# Patient Record
Sex: FEMALE | Race: WHITE | NOT HISPANIC OR LATINO | URBAN - METROPOLITAN AREA
[De-identification: names, ages, dates, MRNs, and addresses within clinical notes are randomized per-mention and may not be internally consistent; named-entity substitution may affect disease eponyms.]

---

## 2017-09-13 ENCOUNTER — INPATIENT (INPATIENT)
Facility: HOSPITAL | Age: 82
LOS: 1 days | Discharge: HOME HEALTH | End: 2017-09-15
Attending: INTERNAL MEDICINE | Admitting: ORTHOPAEDIC SURGERY
Payer: MEDICARE

## 2017-09-13 VITALS — WEIGHT: 164.91 LBS

## 2017-09-13 LAB
ALBUMIN SERPL ELPH-MCNC: 3.4 G/DL — SIGNIFICANT CHANGE UP (ref 3.3–5)
ALLERGY+IMMUNOLOGY DIAG STUDY NOTE: SIGNIFICANT CHANGE UP
ALP SERPL-CCNC: 66 U/L — SIGNIFICANT CHANGE UP (ref 40–120)
ALT FLD-CCNC: 31 U/L — SIGNIFICANT CHANGE UP (ref 12–78)
AMMONIA BLD-MCNC: <10 UMOL/L — LOW (ref 11–32)
ANION GAP SERPL CALC-SCNC: 10 MMOL/L — SIGNIFICANT CHANGE UP (ref 5–17)
ANISOCYTOSIS BLD QL: SLIGHT — SIGNIFICANT CHANGE UP
APTT BLD: 22.6 SEC — LOW (ref 27.5–37.4)
AST SERPL-CCNC: 23 U/L — SIGNIFICANT CHANGE UP (ref 15–37)
BASOPHILS # BLD AUTO: 0 K/UL — SIGNIFICANT CHANGE UP (ref 0–0.2)
BASOPHILS NFR BLD AUTO: 0.3 % — SIGNIFICANT CHANGE UP (ref 0–2)
BILIRUB SERPL-MCNC: 0.5 MG/DL — SIGNIFICANT CHANGE UP (ref 0.2–1.2)
BUN SERPL-MCNC: 47 MG/DL — HIGH (ref 7–23)
CALCIUM SERPL-MCNC: 8.8 MG/DL — SIGNIFICANT CHANGE UP (ref 8.5–10.1)
CHLORIDE SERPL-SCNC: 101 MMOL/L — SIGNIFICANT CHANGE UP (ref 96–108)
CO2 SERPL-SCNC: 25 MMOL/L — SIGNIFICANT CHANGE UP (ref 22–31)
CREAT SERPL-MCNC: 1.25 MG/DL — SIGNIFICANT CHANGE UP (ref 0.5–1.3)
DACRYOCYTES BLD QL SMEAR: SLIGHT — SIGNIFICANT CHANGE UP
ELLIPTOCYTES BLD QL SMEAR: SLIGHT — SIGNIFICANT CHANGE UP
EOSINOPHIL # BLD AUTO: 0 K/UL — SIGNIFICANT CHANGE UP (ref 0–0.5)
EOSINOPHIL NFR BLD AUTO: 0.1 % — SIGNIFICANT CHANGE UP (ref 0–6)
GLUCOSE SERPL-MCNC: 415 MG/DL — HIGH (ref 70–99)
HCT VFR BLD CALC: 24.3 % — LOW (ref 34.5–45)
HGB BLD-MCNC: 8.1 G/DL — LOW (ref 11.5–15.5)
INR BLD: 1.07 RATIO — SIGNIFICANT CHANGE UP (ref 0.88–1.16)
LYMPHOCYTES # BLD AUTO: 0.8 K/UL — LOW (ref 1–3.3)
LYMPHOCYTES # BLD AUTO: 10.5 % — LOW (ref 13–44)
MANUAL DIF COMMENT BLD-IMP: SIGNIFICANT CHANGE UP
MCHC RBC-ENTMCNC: 29.3 PG — SIGNIFICANT CHANGE UP (ref 27–34)
MCHC RBC-ENTMCNC: 33.3 GM/DL — SIGNIFICANT CHANGE UP (ref 32–36)
MCV RBC AUTO: 88 FL — SIGNIFICANT CHANGE UP (ref 80–100)
MONOCYTES # BLD AUTO: 1 K/UL — HIGH (ref 0–0.9)
MONOCYTES NFR BLD AUTO: 12.5 % — SIGNIFICANT CHANGE UP (ref 2–14)
NEUTROPHILS # BLD AUTO: 6 K/UL — SIGNIFICANT CHANGE UP (ref 1.8–7.4)
NEUTROPHILS NFR BLD AUTO: 76.5 % — SIGNIFICANT CHANGE UP (ref 43–77)
OVALOCYTES BLD QL SMEAR: SLIGHT — SIGNIFICANT CHANGE UP
PLAT MORPH BLD: NORMAL — SIGNIFICANT CHANGE UP
PLATELET # BLD AUTO: 193 K/UL — SIGNIFICANT CHANGE UP (ref 150–400)
POIKILOCYTOSIS BLD QL AUTO: SLIGHT — SIGNIFICANT CHANGE UP
POTASSIUM SERPL-MCNC: 4.9 MMOL/L — SIGNIFICANT CHANGE UP (ref 3.5–5.3)
POTASSIUM SERPL-SCNC: 4.9 MMOL/L — SIGNIFICANT CHANGE UP (ref 3.5–5.3)
PROT SERPL-MCNC: 6.9 GM/DL — SIGNIFICANT CHANGE UP (ref 6–8.3)
PROTHROM AB SERPL-ACNC: 11.6 SEC — SIGNIFICANT CHANGE UP (ref 9.8–12.7)
RBC # BLD: 2.76 M/UL — LOW (ref 3.8–5.2)
RBC # FLD: 13.1 % — SIGNIFICANT CHANGE UP (ref 10.3–14.5)
RBC BLD AUTO: (no result)
SCHISTOCYTES BLD QL AUTO: SLIGHT — SIGNIFICANT CHANGE UP
SODIUM SERPL-SCNC: 136 MMOL/L — SIGNIFICANT CHANGE UP (ref 135–145)
TROPONIN I SERPL-MCNC: <0.015 NG/ML — SIGNIFICANT CHANGE UP (ref 0.01–0.04)
WBC # BLD: 7.8 K/UL — SIGNIFICANT CHANGE UP (ref 3.8–10.5)
WBC # FLD AUTO: 7.8 K/UL — SIGNIFICANT CHANGE UP (ref 3.8–10.5)

## 2017-09-13 PROCEDURE — 88311 DECALCIFY TISSUE: CPT | Mod: 26

## 2017-09-13 PROCEDURE — 88305 TISSUE EXAM BY PATHOLOGIST: CPT | Mod: 26

## 2017-09-13 PROCEDURE — 99285 EMERGENCY DEPT VISIT HI MDM: CPT

## 2017-09-13 PROCEDURE — 73552 X-RAY EXAM OF FEMUR 2/>: CPT | Mod: 26

## 2017-09-13 PROCEDURE — 76377 3D RENDER W/INTRP POSTPROCES: CPT | Mod: 26

## 2017-09-13 PROCEDURE — 93010 ELECTROCARDIOGRAM REPORT: CPT

## 2017-09-13 PROCEDURE — 72192 CT PELVIS W/O DYE: CPT | Mod: 26

## 2017-09-13 PROCEDURE — 73502 X-RAY EXAM HIP UNI 2-3 VIEWS: CPT | Mod: 26

## 2017-09-13 PROCEDURE — 70450 CT HEAD/BRAIN W/O DYE: CPT | Mod: 26

## 2017-09-13 PROCEDURE — 71010: CPT | Mod: 26

## 2017-09-13 RX ORDER — QUETIAPINE FUMARATE 200 MG/1
25 TABLET, FILM COATED ORAL AT BEDTIME
Qty: 0 | Refills: 0 | Status: DISCONTINUED | OUTPATIENT
Start: 2017-09-13 | End: 2017-09-13

## 2017-09-13 RX ORDER — HYDROMORPHONE HYDROCHLORIDE 2 MG/ML
1 INJECTION INTRAMUSCULAR; INTRAVENOUS; SUBCUTANEOUS ONCE
Qty: 0 | Refills: 0 | Status: DISCONTINUED | OUTPATIENT
Start: 2017-09-13 | End: 2017-09-13

## 2017-09-13 RX ORDER — ONDANSETRON 8 MG/1
4 TABLET, FILM COATED ORAL ONCE
Qty: 0 | Refills: 0 | Status: DISCONTINUED | OUTPATIENT
Start: 2017-09-13 | End: 2017-09-13

## 2017-09-13 RX ORDER — ACETAMINOPHEN 500 MG
650 TABLET ORAL EVERY 6 HOURS
Qty: 0 | Refills: 0 | Status: DISCONTINUED | OUTPATIENT
Start: 2017-09-13 | End: 2017-09-13

## 2017-09-13 RX ORDER — SODIUM CHLORIDE 9 MG/ML
1000 INJECTION, SOLUTION INTRAVENOUS
Qty: 0 | Refills: 0 | Status: DISCONTINUED | OUTPATIENT
Start: 2017-09-13 | End: 2017-09-13

## 2017-09-13 RX ORDER — DEXTROSE 50 % IN WATER 50 %
1 SYRINGE (ML) INTRAVENOUS ONCE
Qty: 0 | Refills: 0 | Status: DISCONTINUED | OUTPATIENT
Start: 2017-09-13 | End: 2017-09-13

## 2017-09-13 RX ORDER — ACETAMINOPHEN 500 MG
1000 TABLET ORAL ONCE
Qty: 0 | Refills: 0 | Status: DISCONTINUED | OUTPATIENT
Start: 2017-09-13 | End: 2017-09-13

## 2017-09-13 RX ORDER — OXYCODONE HYDROCHLORIDE 5 MG/1
5 TABLET ORAL EVERY 4 HOURS
Qty: 0 | Refills: 0 | Status: DISCONTINUED | OUTPATIENT
Start: 2017-09-13 | End: 2017-09-13

## 2017-09-13 RX ORDER — MORPHINE SULFATE 50 MG/1
2 CAPSULE, EXTENDED RELEASE ORAL EVERY 4 HOURS
Qty: 0 | Refills: 0 | Status: DISCONTINUED | OUTPATIENT
Start: 2017-09-13 | End: 2017-09-13

## 2017-09-13 RX ORDER — SODIUM CHLORIDE 9 MG/ML
500 INJECTION INTRAMUSCULAR; INTRAVENOUS; SUBCUTANEOUS ONCE
Qty: 0 | Refills: 0 | Status: COMPLETED | OUTPATIENT
Start: 2017-09-13 | End: 2017-09-13

## 2017-09-13 RX ORDER — INSULIN LISPRO 100/ML
VIAL (ML) SUBCUTANEOUS EVERY 6 HOURS
Qty: 0 | Refills: 0 | Status: DISCONTINUED | OUTPATIENT
Start: 2017-09-13 | End: 2017-09-13

## 2017-09-13 RX ORDER — ATORVASTATIN CALCIUM 80 MG/1
20 TABLET, FILM COATED ORAL AT BEDTIME
Qty: 0 | Refills: 0 | Status: DISCONTINUED | OUTPATIENT
Start: 2017-09-13 | End: 2017-09-13

## 2017-09-13 RX ORDER — DEXTROSE 50 % IN WATER 50 %
25 SYRINGE (ML) INTRAVENOUS ONCE
Qty: 0 | Refills: 0 | Status: DISCONTINUED | OUTPATIENT
Start: 2017-09-13 | End: 2017-09-13

## 2017-09-13 RX ORDER — FENTANYL CITRATE 50 UG/ML
25 INJECTION INTRAVENOUS
Qty: 0 | Refills: 0 | Status: DISCONTINUED | OUTPATIENT
Start: 2017-09-13 | End: 2017-09-13

## 2017-09-13 RX ORDER — VALSARTAN 80 MG/1
0 TABLET ORAL
Qty: 0 | Refills: 0 | COMMUNITY

## 2017-09-13 RX ORDER — GLUCAGON INJECTION, SOLUTION 0.5 MG/.1ML
1 INJECTION, SOLUTION SUBCUTANEOUS ONCE
Qty: 0 | Refills: 0 | Status: DISCONTINUED | OUTPATIENT
Start: 2017-09-13 | End: 2017-09-13

## 2017-09-13 RX ORDER — AMLODIPINE BESYLATE 2.5 MG/1
5 TABLET ORAL DAILY
Qty: 0 | Refills: 0 | Status: DISCONTINUED | OUTPATIENT
Start: 2017-09-13 | End: 2017-09-13

## 2017-09-13 RX ORDER — INSULIN LISPRO 100/ML
8 VIAL (ML) SUBCUTANEOUS ONCE
Qty: 0 | Refills: 0 | Status: COMPLETED | OUTPATIENT
Start: 2017-09-13 | End: 2017-09-13

## 2017-09-13 RX ORDER — METOPROLOL TARTRATE 50 MG
50 TABLET ORAL DAILY
Qty: 0 | Refills: 0 | Status: DISCONTINUED | OUTPATIENT
Start: 2017-09-13 | End: 2017-09-13

## 2017-09-13 RX ORDER — DEXTROSE 50 % IN WATER 50 %
12.5 SYRINGE (ML) INTRAVENOUS ONCE
Qty: 0 | Refills: 0 | Status: DISCONTINUED | OUTPATIENT
Start: 2017-09-13 | End: 2017-09-13

## 2017-09-13 RX ORDER — METOPROLOL TARTRATE 50 MG
0 TABLET ORAL
Qty: 0 | Refills: 0 | COMMUNITY

## 2017-09-13 RX ORDER — ACETAMINOPHEN 500 MG
1000 TABLET ORAL ONCE
Qty: 0 | Refills: 0 | Status: COMPLETED | OUTPATIENT
Start: 2017-09-13 | End: 2017-09-13

## 2017-09-13 RX ORDER — MEPERIDINE HYDROCHLORIDE 50 MG/ML
12.5 INJECTION INTRAMUSCULAR; INTRAVENOUS; SUBCUTANEOUS
Qty: 0 | Refills: 0 | Status: DISCONTINUED | OUTPATIENT
Start: 2017-09-13 | End: 2017-09-13

## 2017-09-13 RX ADMIN — SODIUM CHLORIDE 75 MILLILITER(S): 9 INJECTION, SOLUTION INTRAVENOUS at 19:18

## 2017-09-13 RX ADMIN — FENTANYL CITRATE 25 MICROGRAM(S): 50 INJECTION INTRAVENOUS at 22:22

## 2017-09-13 RX ADMIN — HYDROMORPHONE HYDROCHLORIDE 1 MILLIGRAM(S): 2 INJECTION INTRAMUSCULAR; INTRAVENOUS; SUBCUTANEOUS at 13:30

## 2017-09-13 RX ADMIN — Medication 650 MILLIGRAM(S): at 12:00

## 2017-09-13 RX ADMIN — HYDROMORPHONE HYDROCHLORIDE 1 MILLIGRAM(S): 2 INJECTION INTRAMUSCULAR; INTRAVENOUS; SUBCUTANEOUS at 14:51

## 2017-09-13 RX ADMIN — AMLODIPINE BESYLATE 5 MILLIGRAM(S): 2.5 TABLET ORAL at 14:54

## 2017-09-13 RX ADMIN — SODIUM CHLORIDE 500 MILLILITER(S): 9 INJECTION INTRAMUSCULAR; INTRAVENOUS; SUBCUTANEOUS at 09:45

## 2017-09-13 RX ADMIN — Medication 650 MILLIGRAM(S): at 11:36

## 2017-09-13 RX ADMIN — Medication 8 UNIT(S): at 12:19

## 2017-09-13 RX ADMIN — MORPHINE SULFATE 2 MILLIGRAM(S): 50 CAPSULE, EXTENDED RELEASE ORAL at 11:37

## 2017-09-13 RX ADMIN — MORPHINE SULFATE 2 MILLIGRAM(S): 50 CAPSULE, EXTENDED RELEASE ORAL at 12:00

## 2017-09-13 RX ADMIN — Medication 400 MILLIGRAM(S): at 22:28

## 2017-09-13 RX ADMIN — Medication 50 MILLIGRAM(S): at 14:54

## 2017-09-13 NOTE — H&P ADULT - ASSESSMENT
This is a 82F PMH of CABG, dementia and Type II DM, HTN who fell on Monday (9/11) at home while standing, 24hr Aide was in another room brought in for evaluation of persistent left leg pain fount to have left intertrochanteric fracture:    # Mechanical Fall  # Left Intertrochanteric Fracture  - case discussed with Ortho --> for IMN today.   - patient with remote history of CABG, no chest pain or dsypnea reported, initial trop X 1 negative. Stable EKG. Given advanced age patient is a moderate risk for noncardiac surgery according to RCI risk calculator. No signs of infection and currently hemodynamically stable. Is medically stable for OR today. Case discussed with son in depth, reviewed code status. DNR per son, forms signed.   - hold Aspirin.   - stable Head CT, no evidence of hemorrhage.   - continue home dose BB, hold ARB (on Valsartan / HCTZ 80 - 12.5mg daily). Consider resuming tomorrow.   - NPO, IVFs while NPO  - IV morphine prn, tylenol.  - will likely need PAT upon discharge, son made aware.     # Type II DM - uncontrolled, 's here. Start SSI q 6hrs, holding home meds.   - check A1c.     # HTN - normotensive, cont BB, hold ARB, see above.     # AOCD - unclear, reviewed records in Portal and patient with Hb 8-9. Appears to be at baseline.   - check stool hemoccult, iron studies. Would benefit from iron tabs.   - transfuse 1 u RBC today in preparation for OR.     # Advanced Dementia - outpatient f/u.    DVT ppx: Venodynes pre-op  Dispo: admit to 2N, case discussed w/ RN / ORtho and family.   DNR    Total time > 80 mins.

## 2017-09-13 NOTE — ED PROVIDER NOTE - CARE PLAN
Principal Discharge DX:	Closed displaced intertrochanteric fracture of left femur, initial encounter

## 2017-09-13 NOTE — ED ADULT TRIAGE NOTE - CHIEF COMPLAINT QUOTE
As per pt son, pt fell two days ago and has had difficulty ambulating and performing ADLs, pt has dementia, pt c/o hip pain

## 2017-09-13 NOTE — ED PROVIDER NOTE - OBJECTIVE STATEMENT
PT comes to the ED accompanied by son s/p fall 2 days ago. Pt son states his mother is usually able to ambulate and do her ADL but since fall 2 days ago she cannot move. Pt has an aide who is with her and went to the bathroom and then was placed back in bed. She decided to walk and fell from the bed. Pt then observed for a day to see if she would improve but then did not and can not ambulate without pain. There is mild swelling to the left hip. TTP of the hip and external rotation of the hip. NVID. No other pain. Not on anticoagulants.

## 2017-09-13 NOTE — H&P ADULT - HISTORY OF PRESENT ILLNESS
This is a 82F PMH of CABG, dementia and Type II DM, HTN who fell on Monday (9/11) at home while standing, 24hr Aide was in another room brought in for evaluation of persistent left leg pain fount to have left intertrochanteric fracture. Much history gathered from son at bedside as patient very forgetful and seen uncomfortable in pain. No recent fevers, chills, signs of bleeding. After her fall, family decided to monitor patient but as leg became more painful to ambulate with she was brought in for medical care. Currently describes increased pain especially with palpation otherwise denies chest pain or SOB. Patient not on blood thinners except for low dose Aspirin 81mg.     ROS: negative unless stated above.     PAST MEDICAL & SURGICAL HISTORY:  Advanced Dementia  DM Type II  S/p CABG (13 years ago)  Hx of MDR UTI (hospitalized in 2016)  Anemia of Chronic Disease - longstanding  HTN / HLD    Allergies: NKDA  Social Hx: nonsmoker, no ETOH or drug use. Son is HCP, DNR signed. Lives at home with 24hr Aide.   Surgical Hx: CABG  Family Hx: noncontributory.       Vital Signs Last 24 Hrs  T(C): 36.6 (13 Sep 2017 12:58), Max: 36.6 (13 Sep 2017 08:05)  T(F): 97.8 (13 Sep 2017 12:58), Max: 97.8 (13 Sep 2017 08:05)  HR: 102 (13 Sep 2017 12:58) (84 - 102)  BP: 121/44 (13 Sep 2017 12:58) (119/62 - 151/97)  BP(mean): --  RR: 20 (13 Sep 2017 12:58) (18 - 22)  SpO2: 100% (13 Sep 2017 12:58) (100% - 100%)    PHYSICAL EXAM:  Constitutional: elderly frail appearing female in mild distress from pain.   HEENT: PERR, EOMI, Normal Hearing, MMM  Neck: Soft and supple, No LAD, No JVD  Respiratory: Breath sounds are clear bilaterally, No wheezing, rales or rhonchi  Cardiovascular: S1 and S2, regular rate and rhythm, no Murmurs, gallops or rubs  Gastrointestinal: Bowel Sounds present, soft, nontender, nondistended, no guarding, no rebound  Extremities: No peripheral edema  Vascular: 2+ peripheral pulses  Neurological: A/O x 3, no focal deficits  Musculoskeletal: 5/5 strength b/l upper extremities. LLE with limited ROM, TTP over midshaft. Ecchymosis and skin bruising noted.   Skin: No rashes    MEDICATIONS:  MEDICATIONS  (STANDING):  lactated ringers. 1000 milliLiter(s) (75 mL/Hr) IV Continuous <Continuous>  insulin lispro (HumaLOG) corrective regimen sliding scale   SubCutaneous every 6 hours  dextrose 5%. 1000 milliLiter(s) (50 mL/Hr) IV Continuous <Continuous>  dextrose 50% Injectable 12.5 Gram(s) IV Push once  dextrose 50% Injectable 25 Gram(s) IV Push once  dextrose 50% Injectable 25 Gram(s) IV Push once  atorvastatin 20 milliGRAM(s) Oral at bedtime  QUEtiapine 25 milliGRAM(s) Oral at bedtime  amLODIPine   Tablet 5 milliGRAM(s) Oral daily  metoprolol succinate ER 50 milliGRAM(s) Oral daily      LABS: All Labs Reviewed:                        8.1    7.8   )-----------( 193      ( 13 Sep 2017 08:43 )             24.3     09-13    136  |  101  |  47<H>  ----------------------------<  415<H>  4.9   |  25  |  1.25    Ca    8.8      13 Sep 2017 08:43    TPro  6.9  /  Alb  3.4  /  TBili  0.5  /  DBili  x   /  AST  23  /  ALT  31  /  AlkPhos  66  09-13    PT/INR - ( 13 Sep 2017 08:43 )   PT: 11.6 sec;   INR: 1.07 ratio         PTT - ( 13 Sep 2017 08:43 )  PTT:22.6 sec  CARDIAC MARKERS ( 13 Sep 2017 08:43 )  <0.015 ng/mL / x     / x     / x     / x          EKG: Reviewed NSR HR 93, Qtc 425, nonspecific T wave changes.      CT Head No Cont (09.13.17 @ 09:01) IMPRESSION:       No evidence of acute intracranial abnormality.  No evidence of   hemorrhage.      Chronic changes as above    < from: CT 3D Reconstruct w/ Workstation (09.13.17 @ 11:39) >  Impression: A non displaced comminuted intertrochanteric fracture   involving the proximal left femur as described.   No evidence of pelvic fractures.

## 2017-09-14 LAB
ANION GAP SERPL CALC-SCNC: 9 MMOL/L — SIGNIFICANT CHANGE UP (ref 5–17)
BASOPHILS # BLD AUTO: 0 K/UL — SIGNIFICANT CHANGE UP (ref 0–0.2)
BASOPHILS NFR BLD AUTO: 0.4 % — SIGNIFICANT CHANGE UP (ref 0–2)
BUN SERPL-MCNC: 33 MG/DL — HIGH (ref 7–23)
CALCIUM SERPL-MCNC: 8.1 MG/DL — LOW (ref 8.5–10.1)
CHLORIDE SERPL-SCNC: 103 MMOL/L — SIGNIFICANT CHANGE UP (ref 96–108)
CO2 SERPL-SCNC: 26 MMOL/L — SIGNIFICANT CHANGE UP (ref 22–31)
CREAT SERPL-MCNC: 0.86 MG/DL — SIGNIFICANT CHANGE UP (ref 0.5–1.3)
EOSINOPHIL # BLD AUTO: 0 K/UL — SIGNIFICANT CHANGE UP (ref 0–0.5)
EOSINOPHIL NFR BLD AUTO: 0.5 % — SIGNIFICANT CHANGE UP (ref 0–6)
FERRITIN SERPL-MCNC: 144 NG/ML — SIGNIFICANT CHANGE UP (ref 15–150)
GLUCOSE SERPL-MCNC: 253 MG/DL — HIGH (ref 70–99)
HBA1C BLD-MCNC: 8.2 % — HIGH (ref 4–5.6)
HBA1C BLD-MCNC: 9.3 % — HIGH (ref 4–5.6)
HCT VFR BLD CALC: 24.6 % — LOW (ref 34.5–45)
HGB BLD-MCNC: 8.4 G/DL — LOW (ref 11.5–15.5)
INR BLD: 1.14 RATIO — SIGNIFICANT CHANGE UP (ref 0.88–1.16)
IRON SATN MFR SERPL: 28 % — SIGNIFICANT CHANGE UP (ref 14–50)
IRON SATN MFR SERPL: 63 UG/DL — SIGNIFICANT CHANGE UP (ref 30–160)
LYMPHOCYTES # BLD AUTO: 0.9 K/UL — LOW (ref 1–3.3)
LYMPHOCYTES # BLD AUTO: 11.6 % — LOW (ref 13–44)
MCHC RBC-ENTMCNC: 29.2 PG — SIGNIFICANT CHANGE UP (ref 27–34)
MCHC RBC-ENTMCNC: 34 GM/DL — SIGNIFICANT CHANGE UP (ref 32–36)
MCV RBC AUTO: 85.8 FL — SIGNIFICANT CHANGE UP (ref 80–100)
MONOCYTES # BLD AUTO: 0.9 K/UL — SIGNIFICANT CHANGE UP (ref 0–0.9)
MONOCYTES NFR BLD AUTO: 11.6 % — SIGNIFICANT CHANGE UP (ref 2–14)
NEUTROPHILS # BLD AUTO: 5.9 K/UL — SIGNIFICANT CHANGE UP (ref 1.8–7.4)
NEUTROPHILS NFR BLD AUTO: 76 % — SIGNIFICANT CHANGE UP (ref 43–77)
PLATELET # BLD AUTO: 171 K/UL — SIGNIFICANT CHANGE UP (ref 150–400)
POTASSIUM SERPL-MCNC: 4.3 MMOL/L — SIGNIFICANT CHANGE UP (ref 3.5–5.3)
POTASSIUM SERPL-SCNC: 4.3 MMOL/L — SIGNIFICANT CHANGE UP (ref 3.5–5.3)
PROTHROM AB SERPL-ACNC: 12.3 SEC — SIGNIFICANT CHANGE UP (ref 9.8–12.7)
RBC # BLD: 2.87 M/UL — LOW (ref 3.8–5.2)
RBC # FLD: 13.9 % — SIGNIFICANT CHANGE UP (ref 10.3–14.5)
SODIUM SERPL-SCNC: 138 MMOL/L — SIGNIFICANT CHANGE UP (ref 135–145)
TIBC SERPL-MCNC: 228 UG/DL — SIGNIFICANT CHANGE UP (ref 220–430)
TRANSFERRIN SERPL-MCNC: 188 MG/DL — LOW (ref 200–360)
UIBC SERPL-MCNC: 165 UG/DL — SIGNIFICANT CHANGE UP (ref 110–370)
WBC # BLD: 7.7 K/UL — SIGNIFICANT CHANGE UP (ref 3.8–10.5)
WBC # FLD AUTO: 7.7 K/UL — SIGNIFICANT CHANGE UP (ref 3.8–10.5)

## 2017-09-14 PROCEDURE — 99223 1ST HOSP IP/OBS HIGH 75: CPT

## 2017-09-14 RX ORDER — SODIUM CHLORIDE 9 MG/ML
1000 INJECTION INTRAMUSCULAR; INTRAVENOUS; SUBCUTANEOUS
Qty: 0 | Refills: 0 | Status: DISCONTINUED | OUTPATIENT
Start: 2017-09-14 | End: 2017-09-15

## 2017-09-14 RX ORDER — MEPERIDINE HYDROCHLORIDE 50 MG/ML
12.5 INJECTION INTRAMUSCULAR; INTRAVENOUS; SUBCUTANEOUS
Qty: 0 | Refills: 0 | Status: DISCONTINUED | OUTPATIENT
Start: 2017-09-14 | End: 2017-09-15

## 2017-09-14 RX ORDER — INSULIN LISPRO 100/ML
3 VIAL (ML) SUBCUTANEOUS
Qty: 0 | Refills: 0 | Status: DISCONTINUED | OUTPATIENT
Start: 2017-09-14 | End: 2017-09-15

## 2017-09-14 RX ORDER — OXYCODONE HYDROCHLORIDE 5 MG/1
5 TABLET ORAL EVERY 4 HOURS
Qty: 0 | Refills: 0 | Status: DISCONTINUED | OUTPATIENT
Start: 2017-09-14 | End: 2017-09-15

## 2017-09-14 RX ORDER — ACETAMINOPHEN 500 MG
650 TABLET ORAL EVERY 6 HOURS
Qty: 0 | Refills: 0 | Status: DISCONTINUED | OUTPATIENT
Start: 2017-09-14 | End: 2017-09-15

## 2017-09-14 RX ORDER — DEXTROSE 50 % IN WATER 50 %
25 SYRINGE (ML) INTRAVENOUS ONCE
Qty: 0 | Refills: 0 | Status: DISCONTINUED | OUTPATIENT
Start: 2017-09-14 | End: 2017-09-14

## 2017-09-14 RX ORDER — DOCUSATE SODIUM 100 MG
100 CAPSULE ORAL THREE TIMES A DAY
Qty: 0 | Refills: 0 | Status: DISCONTINUED | OUTPATIENT
Start: 2017-09-14 | End: 2017-09-15

## 2017-09-14 RX ORDER — INSULIN LISPRO 100/ML
VIAL (ML) SUBCUTANEOUS AT BEDTIME
Qty: 0 | Refills: 0 | Status: DISCONTINUED | OUTPATIENT
Start: 2017-09-14 | End: 2017-09-15

## 2017-09-14 RX ORDER — DEXTROSE 50 % IN WATER 50 %
12.5 SYRINGE (ML) INTRAVENOUS ONCE
Qty: 0 | Refills: 0 | Status: DISCONTINUED | OUTPATIENT
Start: 2017-09-14 | End: 2017-09-14

## 2017-09-14 RX ORDER — HYDROMORPHONE HYDROCHLORIDE 2 MG/ML
0.5 INJECTION INTRAMUSCULAR; INTRAVENOUS; SUBCUTANEOUS
Qty: 0 | Refills: 0 | Status: DISCONTINUED | OUTPATIENT
Start: 2017-09-14 | End: 2017-09-15

## 2017-09-14 RX ORDER — DEXTROSE 50 % IN WATER 50 %
25 SYRINGE (ML) INTRAVENOUS ONCE
Qty: 0 | Refills: 0 | Status: DISCONTINUED | OUTPATIENT
Start: 2017-09-14 | End: 2017-09-15

## 2017-09-14 RX ORDER — DEXTROSE 50 % IN WATER 50 %
1 SYRINGE (ML) INTRAVENOUS ONCE
Qty: 0 | Refills: 0 | Status: DISCONTINUED | OUTPATIENT
Start: 2017-09-14 | End: 2017-09-15

## 2017-09-14 RX ORDER — INSULIN LISPRO 100/ML
VIAL (ML) SUBCUTANEOUS
Qty: 0 | Refills: 0 | Status: DISCONTINUED | OUTPATIENT
Start: 2017-09-14 | End: 2017-09-14

## 2017-09-14 RX ORDER — GLUCAGON INJECTION, SOLUTION 0.5 MG/.1ML
1 INJECTION, SOLUTION SUBCUTANEOUS ONCE
Qty: 0 | Refills: 0 | Status: DISCONTINUED | OUTPATIENT
Start: 2017-09-14 | End: 2017-09-14

## 2017-09-14 RX ORDER — QUETIAPINE FUMARATE 200 MG/1
25 TABLET, FILM COATED ORAL AT BEDTIME
Qty: 0 | Refills: 0 | Status: DISCONTINUED | OUTPATIENT
Start: 2017-09-14 | End: 2017-09-15

## 2017-09-14 RX ORDER — ATORVASTATIN CALCIUM 80 MG/1
20 TABLET, FILM COATED ORAL AT BEDTIME
Qty: 0 | Refills: 0 | Status: DISCONTINUED | OUTPATIENT
Start: 2017-09-14 | End: 2017-09-15

## 2017-09-14 RX ORDER — FAMOTIDINE 10 MG/ML
20 INJECTION INTRAVENOUS EVERY 12 HOURS
Qty: 0 | Refills: 0 | Status: DISCONTINUED | OUTPATIENT
Start: 2017-09-14 | End: 2017-09-15

## 2017-09-14 RX ORDER — TRAMADOL HYDROCHLORIDE 50 MG/1
50 TABLET ORAL EVERY 6 HOURS
Qty: 0 | Refills: 0 | Status: DISCONTINUED | OUTPATIENT
Start: 2017-09-14 | End: 2017-09-15

## 2017-09-14 RX ORDER — SODIUM CHLORIDE 9 MG/ML
1000 INJECTION, SOLUTION INTRAVENOUS
Qty: 0 | Refills: 0 | Status: DISCONTINUED | OUTPATIENT
Start: 2017-09-14 | End: 2017-09-15

## 2017-09-14 RX ORDER — ONDANSETRON 8 MG/1
4 TABLET, FILM COATED ORAL EVERY 6 HOURS
Qty: 0 | Refills: 0 | Status: DISCONTINUED | OUTPATIENT
Start: 2017-09-14 | End: 2017-09-15

## 2017-09-14 RX ORDER — ENOXAPARIN SODIUM 100 MG/ML
40 INJECTION SUBCUTANEOUS DAILY
Qty: 0 | Refills: 0 | Status: DISCONTINUED | OUTPATIENT
Start: 2017-09-14 | End: 2017-09-15

## 2017-09-14 RX ORDER — INSULIN LISPRO 100/ML
VIAL (ML) SUBCUTANEOUS
Qty: 0 | Refills: 0 | Status: DISCONTINUED | OUTPATIENT
Start: 2017-09-14 | End: 2017-09-15

## 2017-09-14 RX ORDER — AMLODIPINE BESYLATE 2.5 MG/1
5 TABLET ORAL DAILY
Qty: 0 | Refills: 0 | Status: DISCONTINUED | OUTPATIENT
Start: 2017-09-14 | End: 2017-09-15

## 2017-09-14 RX ORDER — CEFAZOLIN SODIUM 1 G
2000 VIAL (EA) INJECTION EVERY 8 HOURS
Qty: 0 | Refills: 0 | Status: COMPLETED | OUTPATIENT
Start: 2017-09-14 | End: 2017-09-14

## 2017-09-14 RX ORDER — GLUCAGON INJECTION, SOLUTION 0.5 MG/.1ML
1 INJECTION, SOLUTION SUBCUTANEOUS ONCE
Qty: 0 | Refills: 0 | Status: DISCONTINUED | OUTPATIENT
Start: 2017-09-14 | End: 2017-09-15

## 2017-09-14 RX ORDER — ONDANSETRON 8 MG/1
4 TABLET, FILM COATED ORAL ONCE
Qty: 0 | Refills: 0 | Status: COMPLETED | OUTPATIENT
Start: 2017-09-14 | End: 2017-09-14

## 2017-09-14 RX ORDER — INSULIN GLARGINE 100 [IU]/ML
15 INJECTION, SOLUTION SUBCUTANEOUS AT BEDTIME
Qty: 0 | Refills: 0 | Status: DISCONTINUED | OUTPATIENT
Start: 2017-09-14 | End: 2017-09-15

## 2017-09-14 RX ORDER — METOPROLOL TARTRATE 50 MG
50 TABLET ORAL DAILY
Qty: 0 | Refills: 0 | Status: DISCONTINUED | OUTPATIENT
Start: 2017-09-14 | End: 2017-09-15

## 2017-09-14 RX ORDER — DEXTROSE 50 % IN WATER 50 %
1 SYRINGE (ML) INTRAVENOUS ONCE
Qty: 0 | Refills: 0 | Status: DISCONTINUED | OUTPATIENT
Start: 2017-09-14 | End: 2017-09-14

## 2017-09-14 RX ORDER — DEXTROSE 50 % IN WATER 50 %
12.5 SYRINGE (ML) INTRAVENOUS ONCE
Qty: 0 | Refills: 0 | Status: DISCONTINUED | OUTPATIENT
Start: 2017-09-14 | End: 2017-09-15

## 2017-09-14 RX ORDER — ASPIRIN/CALCIUM CARB/MAGNESIUM 324 MG
325 TABLET ORAL DAILY
Qty: 0 | Refills: 0 | Status: DISCONTINUED | OUTPATIENT
Start: 2017-09-14 | End: 2017-09-14

## 2017-09-14 RX ORDER — SODIUM CHLORIDE 9 MG/ML
1000 INJECTION, SOLUTION INTRAVENOUS
Qty: 0 | Refills: 0 | Status: DISCONTINUED | OUTPATIENT
Start: 2017-09-14 | End: 2017-09-14

## 2017-09-14 RX ADMIN — FAMOTIDINE 20 MILLIGRAM(S): 10 INJECTION INTRAVENOUS at 10:51

## 2017-09-14 RX ADMIN — ENOXAPARIN SODIUM 40 MILLIGRAM(S): 100 INJECTION SUBCUTANEOUS at 11:14

## 2017-09-14 RX ADMIN — Medication 100 MILLIGRAM(S): at 06:01

## 2017-09-14 RX ADMIN — Medication 4: at 11:23

## 2017-09-14 RX ADMIN — Medication 50 MILLIGRAM(S): at 06:01

## 2017-09-14 RX ADMIN — SODIUM CHLORIDE 100 MILLILITER(S): 9 INJECTION INTRAMUSCULAR; INTRAVENOUS; SUBCUTANEOUS at 06:01

## 2017-09-14 RX ADMIN — HYDROMORPHONE HYDROCHLORIDE 0.5 MILLIGRAM(S): 2 INJECTION INTRAMUSCULAR; INTRAVENOUS; SUBCUTANEOUS at 06:10

## 2017-09-14 RX ADMIN — HYDROMORPHONE HYDROCHLORIDE 0.5 MILLIGRAM(S): 2 INJECTION INTRAMUSCULAR; INTRAVENOUS; SUBCUTANEOUS at 06:27

## 2017-09-14 RX ADMIN — Medication 3 UNIT(S): at 16:19

## 2017-09-14 RX ADMIN — Medication 2: at 16:19

## 2017-09-14 RX ADMIN — SODIUM CHLORIDE 100 MILLILITER(S): 9 INJECTION INTRAMUSCULAR; INTRAVENOUS; SUBCUTANEOUS at 04:16

## 2017-09-14 RX ADMIN — Medication 100 MILLIGRAM(S): at 04:15

## 2017-09-14 RX ADMIN — AMLODIPINE BESYLATE 5 MILLIGRAM(S): 2.5 TABLET ORAL at 06:01

## 2017-09-14 RX ADMIN — Medication 100 MILLIGRAM(S): at 11:14

## 2017-09-14 RX ADMIN — ONDANSETRON 4 MILLIGRAM(S): 8 TABLET, FILM COATED ORAL at 11:14

## 2017-09-14 RX ADMIN — INSULIN GLARGINE 15 UNIT(S): 100 INJECTION, SOLUTION SUBCUTANEOUS at 22:44

## 2017-09-14 RX ADMIN — Medication 100 MILLIGRAM(S): at 22:43

## 2017-09-14 RX ADMIN — OXYCODONE HYDROCHLORIDE 5 MILLIGRAM(S): 5 TABLET ORAL at 08:23

## 2017-09-14 RX ADMIN — ONDANSETRON 4 MILLIGRAM(S): 8 TABLET, FILM COATED ORAL at 08:23

## 2017-09-14 RX ADMIN — OXYCODONE HYDROCHLORIDE 5 MILLIGRAM(S): 5 TABLET ORAL at 16:27

## 2017-09-14 RX ADMIN — Medication 6: at 08:24

## 2017-09-14 RX ADMIN — OXYCODONE HYDROCHLORIDE 5 MILLIGRAM(S): 5 TABLET ORAL at 16:24

## 2017-09-14 NOTE — PHYSICAL THERAPY INITIAL EVALUATION ADULT - PERTINENT HX OF CURRENT PROBLEM, REHAB EVAL
Pt admitted to  following fall. H/H-8.4/24.6. Pt admitted to  following fall. H/H-8.4/24.6. CT head: neg. CT left LE: non-displaced comminuted intertrochanteric fx involving the proximal left femur.

## 2017-09-14 NOTE — BRIEF OPERATIVE NOTE - PROCEDURE
<<-----Click on this checkbox to enter Procedure Intramedullary nail fixation of left femur  09/14/2017    Active  NFRANE

## 2017-09-14 NOTE — PHYSICAL THERAPY INITIAL EVALUATION ADULT - LIVES WITH, PROFILE
Pt speaks mainly Serbian and is confused within her own language. Unable ot determine PLOF and home environment..

## 2017-09-14 NOTE — CONSULT NOTE ADULT - SUBJECTIVE AND OBJECTIVE BOX
82F community ambulatory presents to [facility] ED c/o L hip pain and inability to ambulate sp mechanical fall 2 days ago. Pain with ambulation since that time. Denies HS/LOC. Denies numbness/tingling. Denies fever/chills. Denies pain/injury elsewhere. Patient with advanced dementia, son present at bedside. No other orthopedic complaints at this time.     PAST MEDICAL & SURGICAL HISTORY:  Dementia  DM  S/p CABG    MEDICATIONS  (STANDING):  lactated ringers. 1000 milliLiter(s) IV Continuous <Continuous>    Allergies  No Known Allergies                 8.1    7.8   )-----------( 193      ( 13 Sep 2017 08:43 )             24.3     13 Sep 2017 08:43    136    |  101    |  47     ----------------------------<  415    4.9     |  25     |  1.25     Ca    8.8        13 Sep 2017 08:43    TPro  6.9    /  Alb  3.4    /  TBili  0.5    /  DBili  x      /  AST  23     /  ALT  31     /  AlkPhos  66     13 Sep 2017 08:43  PT/INR - ( 13 Sep 2017 08:43 )   PT: 11.6 sec;   INR: 1.07 ratio    PTT - ( 13 Sep 2017 08:43 )  PTT:22.6 sec    Vital Signs Last 24 Hrs  T(C): 36.6 (09-13-17 @ 08:05), Max: 36.6 (09-13-17 @ 08:05)  T(F): 97.8 (09-13-17 @ 08:05), Max: 97.8 (09-13-17 @ 08:05)  HR: 84 (09-13-17 @ 08:05) (84 - 84)  BP: 151/97 (09-13-17 @ 08:05) (151/97 - 151/97)  BP(mean): --  RR: 18 (09-13-17 @ 08:05) (18 - 18)  SpO2: 100% (09-13-17 @ 08:05) (100% - 100%)    Physical Exam  General: NAD, Alert, Awake and oriented  Neurologic: No gross deficits, moving all 4s.  Head: NCAT without abrasions, lacerations, or ecchymosis to head, face, or scalp  Neck/C-Spine: FAROM. No bony TTP.  T/L Spine: No bony TTP, no palpable step-off.    LEFT LE:   No open skin.   Externally Rotated  TTP at greater trochanter  No deformities or other signs of trauma at  knee, lower leg, ankle or foot.  Positive log-roll and heel strike.   Unable to asses ROM at hip/knee/ankle and SLR 2/2 dementia  SILT L3-S1  +DP/PT pulses with brisk cap refill distally  Compartments soft and compressible    Imaging:  XR Hip/Pelvis: L IT fx  CT Pelvis: Ordered
HPI:  This is a 82F PMH of CABG, dementia and Type II DM, HTN who fell on Monday (9/11) at home while standing, 24hr Aide was in another room brought in for evaluation of persistent left leg pain fount to have left intertrochanteric fracture. Much history gathered from son at bedside as patient very forgetful and seen uncomfortable in pain. No recent fevers, chills, signs of bleeding. After her fall, family decided to monitor patient but as leg became more painful to ambulate with she was brought in for medical care. Currently describes increased pain especially with palpation otherwise denies chest pain or SOB. Patient not on blood thinners except for low dose Aspirin 81mg.     Patient is a 82y old female with chief complain of left leg pain, now s/p left IM nail after sustaining left femur fracture s/p fall.      Consulted by Dr. Stock  for VTE prophylaxis, risk stratification, and anticoagulation management.    PAST MEDICAL & SURGICAL HISTORY:  No pertinent past medical history  No significant past surgical history      BMI:33    CrCl:70.1    Caprini VTE Risk Score: 9 (high)    IMPROVE Bleeding Risk Score: 1.5 (low)    Falls Risk:   High ( x )  Mod (  )  Low (  )    9/14: Patient seen in hallway. In chair with sonEsdras as well as personal aide beside her. Patient with dementia. Able to verbalize pain, in [place]. Discussed anticoagulation with son. Denies any history of bleeding or clotting. Discussed Lovenox- son verbalizes agreement and understanding.    FAMILY HISTORY:    Denies any personal or familial history of clotting or bleeding disorders.    Allergies    No Known Allergies    Intolerances      REVIEW OF SYSTEMS    (  )Fever	     (  )Constipation	(  )SOB				(  )Headache	(  )Dysuria  (  )Chills	     (  )Melena	(  )Dyspnea present on exertion	                    (  )Dizziness                    (  )Polyuria  (  )Nausea	     (  )Hematochezia	(  )Cough			                    (  )Syncope   	(  )Hematuria  (  )Vomiting    (  )Chest Pain	(  )Wheezing			(  )Weakness  (  )Diarrhea     (  )Palpitations	(  )Anorexia			(  )Myalgia    All other review of systems negative: Yes    PHYSICAL EXAM:    Constitutional: Appears well    Neurological: Alert, not oriented    Skin: Warm    Respiratory and Thorax: normal effort; Breath sounds: normal; No rales/wheezing/rhonchi  	  Cardiovascular: S1, S2, regular, +gr2/5 murmur    Gastrointestinal: BS + x 4Q, nontender	    Genitourinary:  Bladder nondistended, nontender    Musculoskeletal:   General Right:   no muscle/joint tenderness,   normal tone, no joint swelling,   ROM: full	    General Left:   + muscle/joint tenderness,   normal tone, no joint swelling,   ROM: limited    Hip:  Left: Dressing CDI; Drain: hemovac ; Type of drng.: serosang.; Amt. of drng: small    Lower extrems:   Right: no calf tenderness              negative lilian's sign               + pedal pulses    Left:   no calf tenderness              negative lilian's sign               + pedal pulses                          8.4    7.7   )-----------( 171      ( 14 Sep 2017 05:36 )             24.6       09-14    138  |  103  |  33<H>  ----------------------------<  253<H>  4.3   |  26  |  0.86    Ca    8.1<L>      14 Sep 2017 05:36    TPro  6.9  /  Alb  3.4  /  TBili  0.5  /  DBili  x   /  AST  23  /  ALT  31  /  AlkPhos  66  09-13      PT/INR - ( 14 Sep 2017 05:36 )   PT: 12.3 sec;   INR: 1.14 ratio         PTT - ( 13 Sep 2017 08:43 )  PTT:22.6 sec				    MEDICATIONS  (STANDING):  sodium chloride 0.9%. 1000 milliLiter(s) IV Continuous <Continuous>  docusate sodium Liquid 100 milliGRAM(s) Oral three times a day  insulin lispro (HumaLOG) corrective regimen sliding scale   SubCutaneous three times a day before meals  dextrose 5%. 1000 milliLiter(s) IV Continuous <Continuous>  dextrose 50% Injectable 12.5 Gram(s) IV Push once  dextrose 50% Injectable 25 Gram(s) IV Push once  dextrose 50% Injectable 25 Gram(s) IV Push once  atorvastatin 20 milliGRAM(s) Oral at bedtime  QUEtiapine 25 milliGRAM(s) Oral at bedtime  amLODIPine   Tablet 5 milliGRAM(s) Oral daily  metoprolol succinate ER 50 milliGRAM(s) Oral daily  ceFAZolin   IVPB 2000 milliGRAM(s) IV Intermittent every 8 hours  enoxaparin Injectable 40 milliGRAM(s) SubCutaneous daily      Vital Signs Last 24 Hrs  T(C): 36.8 (14 Sep 2017 06:25), Max: 37.1 (14 Sep 2017 03:30)  T(F): 98.2 (14 Sep 2017 06:25), Max: 98.7 (14 Sep 2017 03:30)  HR: 81 (14 Sep 2017 06:25) (76 - 106)  BP: 124/48 (14 Sep 2017 06:25) (110/37 - 154/90)  BP(mean): --  RR: 18 (14 Sep 2017 06:25) (17 - 22)  SpO2: 96% (14 Sep 2017 06:25) (94% - 100%)    DVT Prophylaxis:  LMWH                   ( x )  Heparin SQ           (  )  Coumadin             (  )  Xarelto                  (  )  Eliquis                   (  )  Venodynes           ( x )  Ambulation          (x  )  UFH                       (  )  Contraindicated  (  )

## 2017-09-14 NOTE — PHYSICAL THERAPY INITIAL EVALUATION ADULT - GENERAL OBSERVATIONS, REHAB EVAL
Pt found sitting OOB in recliner by nursing station with IV, flowtrons, IV, and chair alarm attached to pt. Pain prior to rx: PAINAD 6/10.

## 2017-09-14 NOTE — PHYSICAL THERAPY INITIAL EVALUATION ADULT - PASSIVE RANGE OF MOTION EXAMINATION, REHAB EVAL
DF neutral. Left LE not fully assessed secondary to pain. DF neutral./no Passive ROM deficits were identified

## 2017-09-15 VITALS
OXYGEN SATURATION: 96 % | DIASTOLIC BLOOD PRESSURE: 51 MMHG | HEART RATE: 92 BPM | TEMPERATURE: 99 F | RESPIRATION RATE: 16 BRPM | SYSTOLIC BLOOD PRESSURE: 135 MMHG

## 2017-09-15 LAB
ANION GAP SERPL CALC-SCNC: 5 MMOL/L — SIGNIFICANT CHANGE UP (ref 5–17)
BASOPHILS # BLD AUTO: 0 K/UL — SIGNIFICANT CHANGE UP (ref 0–0.2)
BASOPHILS NFR BLD AUTO: 0.5 % — SIGNIFICANT CHANGE UP (ref 0–2)
BUN SERPL-MCNC: 26 MG/DL — HIGH (ref 7–23)
CALCIUM SERPL-MCNC: 8.3 MG/DL — LOW (ref 8.5–10.1)
CHLORIDE SERPL-SCNC: 108 MMOL/L — SIGNIFICANT CHANGE UP (ref 96–108)
CO2 SERPL-SCNC: 29 MMOL/L — SIGNIFICANT CHANGE UP (ref 22–31)
CREAT SERPL-MCNC: 0.88 MG/DL — SIGNIFICANT CHANGE UP (ref 0.5–1.3)
EOSINOPHIL # BLD AUTO: 0 K/UL — SIGNIFICANT CHANGE UP (ref 0–0.5)
EOSINOPHIL NFR BLD AUTO: 0.6 % — SIGNIFICANT CHANGE UP (ref 0–6)
GLUCOSE SERPL-MCNC: 161 MG/DL — HIGH (ref 70–99)
HCT VFR BLD CALC: 24.1 % — LOW (ref 34.5–45)
HGB BLD-MCNC: 8 G/DL — LOW (ref 11.5–15.5)
LYMPHOCYTES # BLD AUTO: 1 K/UL — SIGNIFICANT CHANGE UP (ref 1–3.3)
LYMPHOCYTES # BLD AUTO: 15.4 % — SIGNIFICANT CHANGE UP (ref 13–44)
MAGNESIUM SERPL-MCNC: 1.9 MG/DL — SIGNIFICANT CHANGE UP (ref 1.6–2.6)
MCHC RBC-ENTMCNC: 28.5 PG — SIGNIFICANT CHANGE UP (ref 27–34)
MCHC RBC-ENTMCNC: 33.2 GM/DL — SIGNIFICANT CHANGE UP (ref 32–36)
MCV RBC AUTO: 85.8 FL — SIGNIFICANT CHANGE UP (ref 80–100)
MONOCYTES # BLD AUTO: 1.1 K/UL — HIGH (ref 0–0.9)
MONOCYTES NFR BLD AUTO: 15.6 % — HIGH (ref 2–14)
NEUTROPHILS # BLD AUTO: 4.6 K/UL — SIGNIFICANT CHANGE UP (ref 1.8–7.4)
NEUTROPHILS NFR BLD AUTO: 67.9 % — SIGNIFICANT CHANGE UP (ref 43–77)
PHOSPHATE SERPL-MCNC: 2.1 MG/DL — LOW (ref 2.5–4.5)
PLATELET # BLD AUTO: 177 K/UL — SIGNIFICANT CHANGE UP (ref 150–400)
POTASSIUM SERPL-MCNC: 4.3 MMOL/L — SIGNIFICANT CHANGE UP (ref 3.5–5.3)
POTASSIUM SERPL-SCNC: 4.3 MMOL/L — SIGNIFICANT CHANGE UP (ref 3.5–5.3)
RBC # BLD: 2.8 M/UL — LOW (ref 3.8–5.2)
RBC # FLD: 14.1 % — SIGNIFICANT CHANGE UP (ref 10.3–14.5)
SODIUM SERPL-SCNC: 142 MMOL/L — SIGNIFICANT CHANGE UP (ref 135–145)
WBC # BLD: 6.7 K/UL — SIGNIFICANT CHANGE UP (ref 3.8–10.5)
WBC # FLD AUTO: 6.7 K/UL — SIGNIFICANT CHANGE UP (ref 3.8–10.5)

## 2017-09-15 PROCEDURE — 99233 SBSQ HOSP IP/OBS HIGH 50: CPT

## 2017-09-15 RX ORDER — FAMOTIDINE 10 MG/ML
1 INJECTION INTRAVENOUS
Qty: 0 | Refills: 0 | COMMUNITY
Start: 2017-09-15

## 2017-09-15 RX ORDER — TRAMADOL HYDROCHLORIDE 50 MG/1
1 TABLET ORAL
Qty: 28 | Refills: 0 | OUTPATIENT
Start: 2017-09-15 | End: 2017-09-22

## 2017-09-15 RX ORDER — ENOXAPARIN SODIUM 100 MG/ML
40 INJECTION SUBCUTANEOUS
Qty: 1 | Refills: 0 | OUTPATIENT
Start: 2017-09-15 | End: 2017-10-11

## 2017-09-15 RX ORDER — ASPIRIN/CALCIUM CARB/MAGNESIUM 324 MG
1 TABLET ORAL
Qty: 0 | Refills: 0 | COMMUNITY

## 2017-09-15 RX ORDER — ATORVASTATIN CALCIUM 80 MG/1
1 TABLET, FILM COATED ORAL
Qty: 0 | Refills: 0 | COMMUNITY

## 2017-09-15 RX ORDER — QUETIAPINE FUMARATE 200 MG/1
1 TABLET, FILM COATED ORAL
Qty: 0 | Refills: 0 | COMMUNITY
Start: 2017-09-15

## 2017-09-15 RX ORDER — QUETIAPINE FUMARATE 200 MG/1
0 TABLET, FILM COATED ORAL
Qty: 0 | Refills: 0 | COMMUNITY

## 2017-09-15 RX ORDER — ENOXAPARIN SODIUM 100 MG/ML
40 INJECTION SUBCUTANEOUS
Qty: 26 | Refills: 0 | OUTPATIENT
Start: 2017-09-15 | End: 2017-10-11

## 2017-09-15 RX ADMIN — Medication 2: at 10:06

## 2017-09-15 RX ADMIN — Medication 6: at 12:09

## 2017-09-15 RX ADMIN — Medication 100 MILLIGRAM(S): at 13:25

## 2017-09-15 RX ADMIN — ENOXAPARIN SODIUM 40 MILLIGRAM(S): 100 INJECTION SUBCUTANEOUS at 11:13

## 2017-09-15 RX ADMIN — Medication 3 UNIT(S): at 12:10

## 2017-09-15 RX ADMIN — Medication 50 MILLIGRAM(S): at 11:13

## 2017-09-15 RX ADMIN — Medication 3 UNIT(S): at 10:07

## 2017-09-15 RX ADMIN — AMLODIPINE BESYLATE 5 MILLIGRAM(S): 2.5 TABLET ORAL at 11:13

## 2017-09-15 NOTE — DISCHARGE NOTE ADULT - MEDICATION SUMMARY - MEDICATIONS TO TAKE
I will START or STAY ON the medications listed below when I get home from the hospital:    Lovenox 40 mg/0.4 mL injectable solution  -- 40 milligram(s) injectable once a day   -- It is very important that you take or use this exactly as directed.  Do not skip doses or discontinue unless directed by your doctor.    -- Indication: For dvt px    LORazepam 0.5 mg oral tablet  -- 1 tab(s) by mouth once a day (at bedtime), As needed, Agitation  -- Indication: For home med    glimepiride 2 mg oral tablet  -- 1 tab(s) by mouth once a day  -- Indication: For home med    Januvia 100 mg oral tablet  -- 1 tab(s) by mouth once a day  -- Indication: For home med    Zetia 10 mg oral tablet  -- Indication: For home med    valsartan-hydrochlorothiazide 80mg-12.5mg oral tablet  -- 1 tab(s) by mouth once a day  -- Indication: For home med    QUEtiapine 25 mg oral tablet  -- 1 tab(s) by mouth once a day (at bedtime)  -- Indication: For home med    metoprolol succinate 50 mg oral tablet, extended release  -- 1 tab(s) by mouth once a day  -- Indication: For home med    amLODIPine 5 mg oral tablet  -- Indication: For home med    famotidine 20 mg oral tablet  -- 1 tab(s) by mouth every 12 hours, As needed, Dyspepsia  -- Indication: For for reflux/dyspepsi    Colace 100 mg oral capsule  -- 1 cap(s) by mouth 2 times a day for constipation  -- Indication: For Constipation    Senna 8.6 mg oral tablet  -- 2 tab(s) by mouth once a day (at bedtime)for constipation  -- Indication: For Constipation

## 2017-09-15 NOTE — PROGRESS NOTE ADULT - ASSESSMENT
This is an 82 year old female s/p left IM nail with high thrombosis risk and low bleeding risk due to age, immobility, BMI- requiring anticoaglation.    Discussed risk vs benefit with son, Esdras, verbalized understanding of Lovenox and necessity for 4 week post-operative period.    Plan:  ::DC ASA  ::Start Lovenox 40 mg SQ daily, first dose today at noon  ::Daily CBC/BMP  :;Enc ambulation  ::B/l sammy    Thank you for this consult, will continue to follow.
A/P: 82F w/ L IT Fxs IMN POD 1  Pain control  DVT ppx  WBAT  Encourage Is  PT  DC planning
A/P: 82F w/ L IT Fxs IMN POD 2  Pain control  DVT ppx  WBAT  Encourage Is  PT  DC planning

## 2017-09-15 NOTE — DISCHARGE NOTE ADULT - NS AS ACTIVITY OBS
Showering allowed/Walking-Outdoors allowed/Walking-Indoors allowed/No Heavy lifting/straining/weightbearing as tolerated

## 2017-09-15 NOTE — DISCHARGE NOTE ADULT - CARE PROVIDER_API CALL
Eliazar Stock), Orthopaedic Surgery; Surgery of the Hand  [address] [place] [address]  Phone: [phone]  Fax: [phone]

## 2017-09-15 NOTE — DISCHARGE NOTE ADULT - CARE PLAN
Principal Discharge DX:	Closed displaced intertrochanteric fracture of left femur, initial encounter  Goal:	return to baseline activities of daily living  Instructions for follow-up, activity and diet:	IM Nail DC Instructions:    1.	Resume previous diet, regular or diabetic as appropriate  2.	Weight Bearing as Tolerated with assistance and rolling walker  3.	Continue DVT/PE Prophylaxis. Lovenox SC. See Med Rec for Duration and dose.  4.	PT daily  5.	Follow up with Orthopedic Surgeon ** in 10-14 Days after Discharge from the Hospital. Call Office asap For Appointment.  6.	Staples to be removed Post-Op Day 14, provided wound is healed, no open areas or copious drainage.  7.	Ice the hip as much as possible  8.	Keep Aquacel bandage on Hip. Change only if wet or soiled using Tegaderm/Paper tape and dry gauze.  9.                OK to shower with Aquacel beige bandage, otherwise sponge bathe only. Will need assistance to shower.

## 2017-09-15 NOTE — PROGRESS NOTE ADULT - SUBJECTIVE AND OBJECTIVE BOX
HPI:  This is a 82F PMH of CABG, dementia and Type II DM, HTN who fell on Monday (9/11) at home while standing, 24hr Aide was in another room brought in for evaluation of persistent left leg pain fount to have left intertrochanteric fracture. Much history gathered from son at bedside as patient very forgetful and seen uncomfortable in pain. No recent fevers, chills, signs of bleeding. After her fall, family decided to monitor patient but as leg became more painful to ambulate with she was brought in for medical care. Currently describes increased pain especially with palpation otherwise denies chest pain or SOB. Patient not on blood thinners except for low dose Aspirin 81mg.     Patient is a 82y old female with chief complain of left leg pain, now s/p left IM nail after sustaining left femur fracture s/p fall.      Consulted by Dr. Stock  for VTE prophylaxis, risk stratification, and anticoagulation management.    PAST MEDICAL & SURGICAL HISTORY:  No pertinent past medical history  No significant past surgical history      BMI:33    CrCl:70.1    Caprini VTE Risk Score: 9 (high)    IMPROVE Bleeding Risk Score: 1.5 (low)    Falls Risk:   High ( x )  Mod (  )  Low (  )    9/14: Patient seen in hallway. In chair with sonEsdras as well as personal aide beside her. Patient with dementia. Able to verbalize pain, in Hungarian. Discussed anticoagulation with son. Denies any history of bleeding or clotting. Discussed Lovenox- son verbalizes agreement and understanding.  9/15/17: seen at bedside, sleeping, no changes    FAMILY HISTORY:    Denies any personal or familial history of clotting or bleeding disorders.    Allergies    No Known Allergies    Intolerances      REVIEW OF SYSTEMS    (  )Fever	     (  )Constipation	(  )SOB				(  )Headache	(  )Dysuria  (  )Chills	     (  )Melena	(  )Dyspnea present on exertion	                    (  )Dizziness                    (  )Polyuria  (  )Nausea	     (  )Hematochezia	(  )Cough			                    (  )Syncope   	(  )Hematuria  (  )Vomiting    (  )Chest Pain	(  )Wheezing			(  )Weakness  (  )Diarrhea     (  )Palpitations	(  )Anorexia			(  )Myalgia    All other review of systems negative: Yes    PHYSICAL EXAM:    Constitutional: Appears well    Neurological: Alert, not oriented    Skin: Warm    Respiratory and Thorax: normal effort; Breath sounds: normal; No rales/wheezing/rhonchi  	  Cardiovascular: S1, S2, regular, +gr2/5 murmur    Gastrointestinal: BS + x 4Q, nontender	    Genitourinary:  Bladder nondistended, nontender    Musculoskeletal:   General Right:   no muscle/joint tenderness,   normal tone, no joint swelling,   ROM: full	    General Left:   + muscle/joint tenderness,   normal tone, no joint swelling,   ROM: limited    Hip:  Left: Dressing CDI; Drain: hemovac ; Type of drng.: serosang.; Amt. of drng: small    Lower extrems:   Right: no calf tenderness              negative lilian's sign               + pedal pulses    Left:   no calf tenderness              negative lilian's sign               + pedal pulses                            8.0    6.7   )-----------( 177      ( 15 Sep 2017 05:38 )             24.1       09-15    142  |  108  |  26<H>  ----------------------------<  161<H>  4.3   |  29  |  0.88    Ca    8.3<L>      15 Sep 2017 05:38  Phos  2.1     09-15  Mg     1.9     09-15        PT/INR - ( 14 Sep 2017 05:36 )   PT: 12.3 sec;   INR: 1.14 ratio                             8.4    7.7   )-----------( 171      ( 14 Sep 2017 05:36 )             24.6       09-14    138  |  103  |  33<H>  ----------------------------<  253<H>  4.3   |  26  |  0.86    Ca    8.1<L>      14 Sep 2017 05:36    TPro  6.9  /  Alb  3.4  /  TBili  0.5  /  DBili  x   /  AST  23  /  ALT  31  /  AlkPhos  66  09-13      PT/INR - ( 14 Sep 2017 05:36 )   PT: 12.3 sec;   INR: 1.14 ratio         PTT - ( 13 Sep 2017 08:43 )  PTT:22.6 sec		    		  MEDICATIONS  (STANDING):  sodium chloride 0.9%. 1000 milliLiter(s) IV Continuous <Continuous>  docusate sodium Liquid 100 milliGRAM(s) Oral three times a day  atorvastatin 20 milliGRAM(s) Oral at bedtime  QUEtiapine 25 milliGRAM(s) Oral at bedtime  amLODIPine   Tablet 5 milliGRAM(s) Oral daily  metoprolol succinate ER 50 milliGRAM(s) Oral daily  enoxaparin Injectable 40 milliGRAM(s) SubCutaneous daily  insulin glargine Injectable (LANTUS) 15 Unit(s) SubCutaneous at bedtime  insulin lispro Injectable (HumaLOG) 3 Unit(s) SubCutaneous three times a day before meals  insulin lispro (HumaLOG) corrective regimen sliding scale   SubCutaneous three times a day before meals  insulin lispro (HumaLOG) corrective regimen sliding scale   SubCutaneous at bedtime  dextrose 5%. 1000 milliLiter(s) IV Continuous <Continuous>  dextrose 50% Injectable 12.5 Gram(s) IV Push once  dextrose 50% Injectable 25 Gram(s) IV Push once  dextrose 50% Injectable 25 Gram(s) IV Push once    Vital Signs Last 24 Hrs  T(C): 37.1 (09-15-17 @ 06:05), Max: 37.2 (09-14-17 @ 23:50)  T(F): 98.8 (09-15-17 @ 06:05), Max: 98.9 (09-14-17 @ 23:50)  HR: 92 (09-15-17 @ 06:05) (78 - 95)  BP: 118/49 (09-15-17 @ 06:05) (111/44 - 119/45)  BP(mean): --  RR: 16 (09-15-17 @ 06:05) (16 - 16)  SpO2: 96% (09-15-17 @ 06:05) (96% - 99%)        DVT Prophylaxis:  LMWH                   ( x )  Heparin SQ           (  )  Coumadin             (  )  Xarelto                  (  )  Eliquis                   (  )  Venodynes           ( x )  Ambulation          (x  )  UFH                       (  )  Contraindicated  (  )
Pt S/E at bedside, no acute events overnight, pain controlled    Vital Signs Last 24 Hrs  T(C): 36.8 (14 Sep 2017 06:25), Max: 37.1 (14 Sep 2017 03:30)  T(F): 98.2 (14 Sep 2017 06:25), Max: 98.7 (14 Sep 2017 03:30)  HR: 81 (14 Sep 2017 06:25) (76 - 106)  BP: 124/48 (14 Sep 2017 06:25) (110/37 - 154/90)  RR: 18 (14 Sep 2017 06:25) (17 - 22)  SpO2: 96% (14 Sep 2017 06:25) (94% - 100%)    Gen: NAD, AAOx3    Left Lower Extremity:  Dressing clean dry intact w/ minor strike through  Non compliant motor exam 2/2 severe dementia  SILT L3-S1  +DP/PT Pulses  Compartments soft  No calf TTP B/L
Pt S/E at bedside, no acute events overnight, pain controlled    Vital Signs Last 24 Hrs  T(C): 37.1 (15 Sep 2017 06:05), Max: 37.2 (14 Sep 2017 23:50)  T(F): 98.8 (15 Sep 2017 06:05), Max: 98.9 (14 Sep 2017 23:50)  HR: 92 (15 Sep 2017 06:05) (78 - 95)  BP: 118/49 (15 Sep 2017 06:05) (111/44 - 119/45)  BP(mean): --  RR: 16 (15 Sep 2017 06:05) (16 - 16)  SpO2: 96% (15 Sep 2017 06:05) (96% - 99%)    Gen: NAD, AAOx3    Left Lower Extremity:  Dressing clean dry intact w/ minor strike through  Non compliant motor exam 2/2 severe dementia  SILT L3-S1  +DP/PT Pulses  Compartments soft  No calf TTP B/L
c/c: fall, lle pain    HPI:  This is a 82F PMH of CAD, CABG, dementia and Type II DM, HTN, anemia of chronic disease, HLD, who fell on Monday (9/11) at home while standing, 24hr Aide was in another room brought in for evaluation of persistent left leg pain fount to have left intertrochanteric fracture. She was admitted with left hip fracture. Underwent IM nail with ortho 9/13.     9/14: pt seen and examined this am with family member. Was c/o nausea/heartburn. Had just received pepcid.    ROS: all 10 systems reviewed as above otherwise negative.      Vital Signs Last 24 Hrs  T(C): 36.2 (14 Sep 2017 11:24), Max: 37.1 (14 Sep 2017 03:30)  T(F): 97.2 (14 Sep 2017 11:24), Max: 98.7 (14 Sep 2017 03:30)  HR: 78 (14 Sep 2017 11:24) (76 - 95)  BP: 118/43 (14 Sep 2017 11:24) (110/37 - 154/90)  RR: 16 (14 Sep 2017 11:24) (16 - 18)  SpO2: 99% (14 Sep 2017 11:24) (94% - 100%)    PHYSICAL EXAM:    GENERAL: uncomfortable  HEAD:  Normocephalic, atraumatic  EYES: EOMI, PERRLA  HEENT: Moist mucous membranes  NECK: Supple, No JVD  NERVOUS SYSTEM:  Awake, alert, non focal, but confused.  CHEST/LUNG: Clear to auscultation bilaterally  HEART: Regular rate and rhythm  ABDOMEN: Soft, Nontender, Nondistended, Bowel sounds present  GENITOURINARY: Voiding, no palpable bladder  EXTREMITIES:   No clubbing, cyanosis, or edema  MUSCULOSKELTAL- Left hip tenderness.  SKIN-no rash      MEDICATIONS:  MEDICATIONS  (STANDING):  lactated ringers. 1000 milliLiter(s) (75 mL/Hr) IV Continuous <Continuous>  insulin lispro (HumaLOG) corrective regimen sliding scale   SubCutaneous every 6 hours  dextrose 5%. 1000 milliLiter(s) (50 mL/Hr) IV Continuous <Continuous>  dextrose 50% Injectable 12.5 Gram(s) IV Push once  dextrose 50% Injectable 25 Gram(s) IV Push once  dextrose 50% Injectable 25 Gram(s) IV Push once  atorvastatin 20 milliGRAM(s) Oral at bedtime  QUEtiapine 25 milliGRAM(s) Oral at bedtime  amLODIPine   Tablet 5 milliGRAM(s) Oral daily  metoprolol succinate ER 50 milliGRAM(s) Oral daily    LABS:                        8.4    7.7   )-----------( 171      ( 14 Sep 2017 05:36 )             24.6     09-14    138  |  103  |  33<H>  ----------------------------<  253<H>  4.3   |  26  |  0.86    Ca    8.1<L>      14 Sep 2017 05:36    TPro  6.9  /  Alb  3.4  /  TBili  0.5  /  DBili  x   /  AST  23  /  ALT  31  /  AlkPhos  66  09-13    PT/INR - ( 14 Sep 2017 05:36 )   PT: 12.3 sec;   INR: 1.14 ratio         PTT - ( 13 Sep 2017 08:43 )  PTT:22.6 sec      CAPILLARY BLOOD GLUCOSE  239 (14 Sep 2017 11:22)  276 (14 Sep 2017 07:39)  150 (13 Sep 2017 15:38)    CARDIAC MARKERS ( 13 Sep 2017 08:43 )  <0.015 ng/mL / x     / x     / x     / x                    EKG: Reviewed NSR HR 93, Qtc 425, nonspecific T wave changes.      CT Head No Cont (09.13.17 @ 09:01) IMPRESSION:       No evidence of acute intracranial abnormality.  No evidence of   hemorrhage.      Chronic changes as above    < from: CT 3D Reconstruct w/ Workstation (09.13.17 @ 11:39) >  Impression: A non displaced comminuted intertrochanteric fracture   involving the proximal left femur as described.   No evidence of pelvic fractures. (13 Sep 2017 12:56)        ASSESSMENT AND PLAN:  82f, PMH AS ABOVE A/W:    1. [employer] fall/Left hip fracture:  -s/p IM nail POD#1  -pain control  -physical therapy  -incentive spirometry if able.    2. GERD:  -got pepcid, and zofran for nausea    3. HTN:  -continue bb  -resume arb/hctz as bp allows    4. DMII:  -start lantus/premeal while inpatient.    5. Anemia of chronic disease:  -monitor h/h    6. Dementia:  -reorient prn  -on seroquel    7. DVT px    7.
c/c: fall, lle pain    HPI:  This is a 82F PMH of CAD, CABG, dementia and Type II DM, HTN, anemia of chronic disease, HLD, who fell on Monday (9/11) at home while standing, 24hr Aide was in another room brought in for evaluation of persistent left leg pain fount to have left intertrochanteric fracture. She was admitted with left hip fracture. Underwent IM nail with ortho 9/13.     9/15: Pt seen and examined. Sleeping but arousable. NO acute overnight events.    ROS: all 10 systems reviewed as above otherwise negative.    Vital Signs Last 24 Hrs  T(C): 37.1 (15 Sep 2017 06:05), Max: 37.2 (14 Sep 2017 23:50)  T(F): 98.8 (15 Sep 2017 06:05), Max: 98.9 (14 Sep 2017 23:50)  HR: 92 (15 Sep 2017 06:05) (78 - 95)  BP: 118/49 (15 Sep 2017 06:05) (111/44 - 119/45)  RR: 16 (15 Sep 2017 06:05) (16 - 16)  SpO2: 96% (15 Sep 2017 06:05) (96% - 99%)    PHYSICAL EXAM:  GENERAL: sleeping but arousable.   HEAD:  Normocephalic, atraumatic  EYES: EOMI, PERRLA  HEENT: Moist mucous membranes  NECK: Supple, No JVD  NERVOUS SYSTEM:  sleeping, but arousable. Non focal exam  CHEST/LUNG: Clear to auscultation bilaterally  HEART: Regular rate and rhythm  ABDOMEN: Soft, Nontender, Nondistended, Bowel sounds present  GENITOURINARY: Voiding, no palpable bladder  EXTREMITIES:   No clubbing, cyanosis, or edema  MUSCULOSKELTAL- Left hip tenderness.  SKIN-no rash    MEDICATIONS  (STANDING):  sodium chloride 0.9%. 1000 milliLiter(s) (100 mL/Hr) IV Continuous <Continuous>  docusate sodium Liquid 100 milliGRAM(s) Oral three times a day  atorvastatin 20 milliGRAM(s) Oral at bedtime  QUEtiapine 25 milliGRAM(s) Oral at bedtime  amLODIPine   Tablet 5 milliGRAM(s) Oral daily  metoprolol succinate ER 50 milliGRAM(s) Oral daily  enoxaparin Injectable 40 milliGRAM(s) SubCutaneous daily  insulin glargine Injectable (LANTUS) 15 Unit(s) SubCutaneous at bedtime  insulin lispro Injectable (HumaLOG) 3 Unit(s) SubCutaneous three times a day before meals  insulin lispro (HumaLOG) corrective regimen sliding scale   SubCutaneous three times a day before meals  insulin lispro (HumaLOG) corrective regimen sliding scale   SubCutaneous at bedtime  dextrose 5%. 1000 milliLiter(s) (50 mL/Hr) IV Continuous <Continuous>  dextrose 50% Injectable 12.5 Gram(s) IV Push once  dextrose 50% Injectable 25 Gram(s) IV Push once  dextrose 50% Injectable 25 Gram(s) IV Push once    MEDICATIONS  (PRN):  acetaminophen   Tablet 650 milliGRAM(s) Oral every 6 hours PRN For Temp greater than 38 C (100.4 F) and headache  ondansetron Injectable 4 milliGRAM(s) IV Push every 6 hours PRN Nausea  famotidine    Tablet 20 milliGRAM(s) Oral every 12 hours PRN Dyspepsia  traMADol 50 milliGRAM(s) Oral every 6 hours PRN Mild Pain (1 - 3)  oxyCODONE    IR 5 milliGRAM(s) Oral every 4 hours PRN Moderate Pain (4 - 6)  HYDROmorphone  Injectable 0.5 milliGRAM(s) IV Push every 3 hours PRN Severe Pain (7 - 10)  LORazepam     Tablet 0.5 milliGRAM(s) Oral at bedtime PRN Agitation  meperidine     Injectable 12.5 milliGRAM(s) IV Push every 10 minutes PRN Shivering  dextrose Gel 1 Dose(s) Oral once PRN Blood Glucose LESS THAN 70 milliGRAM(s)/deciLiter  glucagon  Injectable 1 milliGRAM(s) IntraMuscular once PRN Glucose <70 milliGRAM(s)/deciLiter    LABS:                        8.0    6.7   )-----------( 177      ( 15 Sep 2017 05:38 )             24.1     09-15    142  |  108  |  26<H>  ----------------------------<  161<H>  4.3   |  29  |  0.88    Ca    8.3<L>      15 Sep 2017 05:38  Phos  2.1     09-15  Mg     1.9     09-15      PT/INR - ( 14 Sep 2017 05:36 )   PT: 12.3 sec;   INR: 1.14 ratio       CAPILLARY BLOOD GLUCOSE  157 (15 Sep 2017 07:46)  229 (14 Sep 2017 22:17)  165 (14 Sep 2017 16:17)  239 (14 Sep 2017 11:22)    CARDIAC MARKERS ( 13 Sep 2017 08:43 )  <0.015 ng/mL / x     / x     / x     / x                    EKG: Reviewed NSR HR 93, Qtc 425, nonspecific T wave changes.      CT Head No Cont (09.13.17 @ 09:01) IMPRESSION:       No evidence of acute intracranial abnormality.  No evidence of   hemorrhage.      Chronic changes as above    < from: CT 3D Reconstruct w/ Workstation (09.13.17 @ 11:39) >  Impression: A non displaced comminuted intertrochanteric fracture   involving the proximal left femur as described.   No evidence of pelvic fractures. (13 Sep 2017 12:56)        ASSESSMENT AND PLAN:  82f, PMH AS ABOVE A/W:    1. [employer] fall/Left hip fracture:  -s/p IM nail POD#2  -pain control  -physical therapy  -incentive spirometry if able.    2. Acute blood loss anemia:  -getting one unit prbcs today    3. GERD:  -continue pepcid    4. HTN:  -continue bb  -resume arb/hctz as bp allows    5. DMII:  -on lantus/insulin while inpatient.    6. Anemia of chronic disease:  -getting prbcs today.    7. Dementia:  -reorient prn  -on seroquel    8. DVT px    Dispo:  DC planning to home.   DUE TO RECENT IM NAILING OF LEFT HIP PATIENT  CANNOT SAFELY AND EFFECTIVELY PERFORM ANY OF HER MRADLS WITH IN HER HOME WITH A CANE/CRUTCH/OR WALKER. THIS PATIENT IS NOT ABLE TO SELF PROPEL A STANDARD WHEELCHAIR AND HAS A CAREGIVER WHO IS WILLING AND ABLE TO OPERATE A TRANSPORT CHAIR.   DUE TO DIAGNOSIS OF LEFT HIP FRACTURE THIS PATIENT REQUIRES A HOSPITAL BED FOR FEASIBLE TURNING AND POSITIONING THAT CANNOT BE ACHIEVED IN A STANDARD BED. DUE TO DIAGNOISS OF LEFT HIP FRACTURE, THIS PATIENT HAS SEVERE PAIN AND REQUIRES A HOSPITAL BED FOR SAFETY AND COMFORT.

## 2017-09-15 NOTE — DISCHARGE NOTE ADULT - PLAN OF CARE
return to baseline activities of daily living IM Nail DC Instructions:    1.	Resume previous diet, regular or diabetic as appropriate  2.	Weight Bearing as Tolerated with assistance and rolling walker  3.	Continue DVT/PE Prophylaxis. Lovenox SC. See Med Rec for Duration and dose.  4.	PT daily  5.	Follow up with Orthopedic Surgeon ** in 10-14 Days after Discharge from the Hospital. Call Office asap For Appointment.  6.	Staples to be removed Post-Op Day 14, provided wound is healed, no open areas or copious drainage.  7.	Ice the hip as much as possible  8.	Keep Aquacel bandage on Hip. Change only if wet or soiled using Tegaderm/Paper tape and dry gauze.  9.                OK to shower with Aquacel beige bandage, otherwise sponge bathe only. Will need assistance to shower.

## 2017-09-18 LAB — SURGICAL PATHOLOGY FINAL REPORT - CH: SIGNIFICANT CHANGE UP

## 2017-09-19 DIAGNOSIS — Z79.4 LONG TERM (CURRENT) USE OF INSULIN: ICD-10-CM

## 2017-09-19 DIAGNOSIS — I25.10 ATHEROSCLEROTIC HEART DISEASE OF NATIVE CORONARY ARTERY WITHOUT ANGINA PECTORIS: ICD-10-CM

## 2017-09-19 DIAGNOSIS — I10 ESSENTIAL (PRIMARY) HYPERTENSION: ICD-10-CM
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
## 2017-09-19 DIAGNOSIS — S72.142A DISPLACED INTERTROCHANTERIC FRACTURE OF LEFT FEMUR, INITIAL ENCOUNTER FOR CLOSED FRACTURE: ICD-10-CM
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
## 2017-09-19 DIAGNOSIS — D62 ACUTE POSTHEMORRHAGIC ANEMIA: ICD-10-CM

## 2017-09-19 DIAGNOSIS — F03.90 UNSPECIFIED DEMENTIA, UNSPECIFIED SEVERITY, WITHOUT BEHAVIORAL DISTURBANCE, PSYCHOTIC DISTURBANCE, MOOD DISTURBANCE, AND ANXIETY: ICD-10-CM

## 2017-09-19 DIAGNOSIS — E78.5 HYPERLIPIDEMIA, UNSPECIFIED: ICD-10-CM

## 2017-09-19 DIAGNOSIS — D63.8 ANEMIA IN OTHER CHRONIC DISEASES CLASSIFIED ELSEWHERE: ICD-10-CM
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
## 2017-09-19 DIAGNOSIS — E11.9 TYPE 2 DIABETES MELLITUS WITHOUT COMPLICATIONS: ICD-10-CM

## 2017-09-19 DIAGNOSIS — K21.9 GASTRO-ESOPHAGEAL REFLUX DISEASE WITHOUT ESOPHAGITIS: ICD-10-CM
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
## 2017-10-12 RX ORDER — MUPIROCIN 20 MG/G
1 OINTMENT TOPICAL
Qty: 0 | Refills: 0 | COMMUNITY
Start: 2017-10-12

## 2017-11-14 ENCOUNTER — INPATIENT (INPATIENT)
Facility: HOSPITAL | Age: 82
LOS: 1 days | Discharge: HOME HEALTH | End: 2017-11-16
Attending: FAMILY MEDICINE | Admitting: FAMILY MEDICINE
Payer: MEDICARE

## 2017-11-14 VITALS
HEART RATE: 65 BPM | OXYGEN SATURATION: 100 % | DIASTOLIC BLOOD PRESSURE: 54 MMHG | RESPIRATION RATE: 17 BRPM | TEMPERATURE: 98 F | SYSTOLIC BLOOD PRESSURE: 155 MMHG

## 2017-11-14 LAB
ALBUMIN SERPL ELPH-MCNC: 3.4 G/DL — SIGNIFICANT CHANGE UP (ref 3.3–5)
ALP SERPL-CCNC: 125 U/L — HIGH (ref 40–120)
ALT FLD-CCNC: 23 U/L — SIGNIFICANT CHANGE UP (ref 12–78)
ANION GAP SERPL CALC-SCNC: 6 MMOL/L — SIGNIFICANT CHANGE UP (ref 5–17)
APTT BLD: 25.6 SEC — LOW (ref 27.5–37.4)
AST SERPL-CCNC: 19 U/L — SIGNIFICANT CHANGE UP (ref 15–37)
BASOPHILS # BLD AUTO: 0 K/UL — SIGNIFICANT CHANGE UP (ref 0–0.2)
BASOPHILS NFR BLD AUTO: 0.6 % — SIGNIFICANT CHANGE UP (ref 0–2)
BILIRUB SERPL-MCNC: 0.4 MG/DL — SIGNIFICANT CHANGE UP (ref 0.2–1.2)
BUN SERPL-MCNC: 24 MG/DL — HIGH (ref 7–23)
CALCIUM SERPL-MCNC: 9 MG/DL — SIGNIFICANT CHANGE UP (ref 8.5–10.1)
CHLORIDE SERPL-SCNC: 101 MMOL/L — SIGNIFICANT CHANGE UP (ref 96–108)
CO2 SERPL-SCNC: 28 MMOL/L — SIGNIFICANT CHANGE UP (ref 22–31)
CREAT SERPL-MCNC: 0.83 MG/DL — SIGNIFICANT CHANGE UP (ref 0.5–1.3)
EOSINOPHIL # BLD AUTO: 0.1 K/UL — SIGNIFICANT CHANGE UP (ref 0–0.5)
EOSINOPHIL NFR BLD AUTO: 2.3 % — SIGNIFICANT CHANGE UP (ref 0–6)
ERYTHROCYTE [SEDIMENTATION RATE] IN BLOOD: 28 MM/HR — HIGH (ref 0–20)
GLUCOSE BLDC GLUCOMTR-MCNC: 80 MG/DL — SIGNIFICANT CHANGE UP (ref 70–99)
GLUCOSE SERPL-MCNC: 236 MG/DL — HIGH (ref 70–99)
HCT VFR BLD CALC: 35 % — SIGNIFICANT CHANGE UP (ref 34.5–45)
HGB BLD-MCNC: 11.3 G/DL — LOW (ref 11.5–15.5)
INR BLD: 1 RATIO — SIGNIFICANT CHANGE UP (ref 0.88–1.16)
LYMPHOCYTES # BLD AUTO: 1.2 K/UL — SIGNIFICANT CHANGE UP (ref 1–3.3)
LYMPHOCYTES # BLD AUTO: 19.7 % — SIGNIFICANT CHANGE UP (ref 13–44)
MCHC RBC-ENTMCNC: 29.8 PG — SIGNIFICANT CHANGE UP (ref 27–34)
MCHC RBC-ENTMCNC: 32.3 GM/DL — SIGNIFICANT CHANGE UP (ref 32–36)
MCV RBC AUTO: 92.1 FL — SIGNIFICANT CHANGE UP (ref 80–100)
MONOCYTES # BLD AUTO: 0.5 K/UL — SIGNIFICANT CHANGE UP (ref 0–0.9)
MONOCYTES NFR BLD AUTO: 8.9 % — SIGNIFICANT CHANGE UP (ref 2–14)
NEUTROPHILS # BLD AUTO: 4.1 K/UL — SIGNIFICANT CHANGE UP (ref 1.8–7.4)
NEUTROPHILS NFR BLD AUTO: 68.6 % — SIGNIFICANT CHANGE UP (ref 43–77)
PLATELET # BLD AUTO: 272 K/UL — SIGNIFICANT CHANGE UP (ref 150–400)
POTASSIUM SERPL-MCNC: 4.5 MMOL/L — SIGNIFICANT CHANGE UP (ref 3.5–5.3)
POTASSIUM SERPL-SCNC: 4.5 MMOL/L — SIGNIFICANT CHANGE UP (ref 3.5–5.3)
PROT SERPL-MCNC: 7.4 GM/DL — SIGNIFICANT CHANGE UP (ref 6–8.3)
PROTHROM AB SERPL-ACNC: 10.8 SEC — SIGNIFICANT CHANGE UP (ref 9.8–12.7)
RBC # BLD: 3.8 M/UL — SIGNIFICANT CHANGE UP (ref 3.8–5.2)
RBC # FLD: 12.4 % — SIGNIFICANT CHANGE UP (ref 10.3–14.5)
SODIUM SERPL-SCNC: 135 MMOL/L — SIGNIFICANT CHANGE UP (ref 135–145)
WBC # BLD: 6 K/UL — SIGNIFICANT CHANGE UP (ref 3.8–10.5)
WBC # FLD AUTO: 6 K/UL — SIGNIFICANT CHANGE UP (ref 3.8–10.5)

## 2017-11-14 PROCEDURE — 99285 EMERGENCY DEPT VISIT HI MDM: CPT

## 2017-11-14 PROCEDURE — 93010 ELECTROCARDIOGRAM REPORT: CPT

## 2017-11-14 PROCEDURE — 73630 X-RAY EXAM OF FOOT: CPT | Mod: 26,LT

## 2017-11-14 PROCEDURE — 71010: CPT | Mod: 26

## 2017-11-14 RX ORDER — VALSARTAN 80 MG/1
80 TABLET ORAL DAILY
Qty: 0 | Refills: 0 | Status: DISCONTINUED | OUTPATIENT
Start: 2017-11-14 | End: 2017-11-16

## 2017-11-14 RX ORDER — ENOXAPARIN SODIUM 100 MG/ML
40 INJECTION SUBCUTANEOUS EVERY 24 HOURS
Qty: 0 | Refills: 0 | Status: DISCONTINUED | OUTPATIENT
Start: 2017-11-14 | End: 2017-11-16

## 2017-11-14 RX ORDER — ACETAMINOPHEN 500 MG
650 TABLET ORAL EVERY 6 HOURS
Qty: 0 | Refills: 0 | Status: DISCONTINUED | OUTPATIENT
Start: 2017-11-14 | End: 2017-11-16

## 2017-11-14 RX ORDER — MUPIROCIN 20 MG/G
1 OINTMENT TOPICAL DAILY
Qty: 0 | Refills: 0 | Status: DISCONTINUED | OUTPATIENT
Start: 2017-11-14 | End: 2017-11-16

## 2017-11-14 RX ORDER — SITAGLIPTIN 50 MG/1
1 TABLET, FILM COATED ORAL
Qty: 0 | Refills: 0 | COMMUNITY

## 2017-11-14 RX ORDER — INSULIN LISPRO 100/ML
VIAL (ML) SUBCUTANEOUS
Qty: 0 | Refills: 0 | Status: DISCONTINUED | OUTPATIENT
Start: 2017-11-14 | End: 2017-11-16

## 2017-11-14 RX ORDER — AMLODIPINE BESYLATE 2.5 MG/1
5 TABLET ORAL DAILY
Qty: 0 | Refills: 0 | Status: DISCONTINUED | OUTPATIENT
Start: 2017-11-14 | End: 2017-11-16

## 2017-11-14 RX ORDER — ONDANSETRON 8 MG/1
4 TABLET, FILM COATED ORAL EVERY 6 HOURS
Qty: 0 | Refills: 0 | Status: DISCONTINUED | OUTPATIENT
Start: 2017-11-14 | End: 2017-11-14

## 2017-11-14 RX ORDER — DEXTROSE 50 % IN WATER 50 %
1 SYRINGE (ML) INTRAVENOUS ONCE
Qty: 0 | Refills: 0 | Status: DISCONTINUED | OUTPATIENT
Start: 2017-11-14 | End: 2017-11-16

## 2017-11-14 RX ORDER — METOPROLOL TARTRATE 50 MG
1 TABLET ORAL
Qty: 0 | Refills: 0 | COMMUNITY

## 2017-11-14 RX ORDER — PIPERACILLIN AND TAZOBACTAM 4; .5 G/20ML; G/20ML
3.38 INJECTION, POWDER, LYOPHILIZED, FOR SOLUTION INTRAVENOUS EVERY 12 HOURS
Qty: 0 | Refills: 0 | Status: DISCONTINUED | OUTPATIENT
Start: 2017-11-14 | End: 2017-11-14

## 2017-11-14 RX ORDER — INSULIN LISPRO 100/ML
VIAL (ML) SUBCUTANEOUS AT BEDTIME
Qty: 0 | Refills: 0 | Status: DISCONTINUED | OUTPATIENT
Start: 2017-11-14 | End: 2017-11-16

## 2017-11-14 RX ORDER — SODIUM CHLORIDE 9 MG/ML
1000 INJECTION, SOLUTION INTRAVENOUS
Qty: 0 | Refills: 0 | Status: DISCONTINUED | OUTPATIENT
Start: 2017-11-14 | End: 2017-11-16

## 2017-11-14 RX ORDER — DEXTROSE 50 % IN WATER 50 %
12.5 SYRINGE (ML) INTRAVENOUS ONCE
Qty: 0 | Refills: 0 | Status: DISCONTINUED | OUTPATIENT
Start: 2017-11-14 | End: 2017-11-16

## 2017-11-14 RX ORDER — GLUCAGON INJECTION, SOLUTION 0.5 MG/.1ML
1 INJECTION, SOLUTION SUBCUTANEOUS ONCE
Qty: 0 | Refills: 0 | Status: DISCONTINUED | OUTPATIENT
Start: 2017-11-14 | End: 2017-11-16

## 2017-11-14 RX ORDER — METFORMIN HYDROCHLORIDE 850 MG/1
0 TABLET ORAL
Qty: 0 | Refills: 0 | COMMUNITY

## 2017-11-14 RX ORDER — CEFEPIME 1 G/1
1000 INJECTION, POWDER, FOR SOLUTION INTRAMUSCULAR; INTRAVENOUS EVERY 12 HOURS
Qty: 0 | Refills: 0 | Status: DISCONTINUED | OUTPATIENT
Start: 2017-11-14 | End: 2017-11-14

## 2017-11-14 RX ORDER — CEFEPIME 1 G/1
1000 INJECTION, POWDER, FOR SOLUTION INTRAMUSCULAR; INTRAVENOUS EVERY 12 HOURS
Qty: 0 | Refills: 0 | Status: DISCONTINUED | OUTPATIENT
Start: 2017-11-14 | End: 2017-11-16

## 2017-11-14 RX ORDER — SENNA PLUS 8.6 MG/1
2 TABLET ORAL
Qty: 0 | Refills: 0 | COMMUNITY

## 2017-11-14 RX ORDER — ATORVASTATIN CALCIUM 80 MG/1
20 TABLET, FILM COATED ORAL AT BEDTIME
Qty: 0 | Refills: 0 | Status: DISCONTINUED | OUTPATIENT
Start: 2017-11-14 | End: 2017-11-16

## 2017-11-14 RX ORDER — DEXTROSE 50 % IN WATER 50 %
25 SYRINGE (ML) INTRAVENOUS ONCE
Qty: 0 | Refills: 0 | Status: DISCONTINUED | OUTPATIENT
Start: 2017-11-14 | End: 2017-11-16

## 2017-11-14 RX ORDER — EZETIMIBE 10 MG/1
0 TABLET ORAL
Qty: 0 | Refills: 0 | COMMUNITY

## 2017-11-14 RX ORDER — GLIMEPIRIDE 1 MG
1 TABLET ORAL
Qty: 0 | Refills: 0 | COMMUNITY

## 2017-11-14 RX ORDER — DOCUSATE SODIUM 100 MG
1 CAPSULE ORAL
Qty: 0 | Refills: 0 | COMMUNITY

## 2017-11-14 RX ORDER — SODIUM CHLORIDE 9 MG/ML
1000 INJECTION, SOLUTION INTRAVENOUS
Qty: 0 | Refills: 0 | Status: DISCONTINUED | OUTPATIENT
Start: 2017-11-15 | End: 2017-11-16

## 2017-11-14 RX ORDER — DOCUSATE SODIUM 100 MG
100 CAPSULE ORAL DAILY
Qty: 0 | Refills: 0 | Status: DISCONTINUED | OUTPATIENT
Start: 2017-11-14 | End: 2017-11-16

## 2017-11-14 RX ORDER — HYDROCHLOROTHIAZIDE 25 MG
12.5 TABLET ORAL DAILY
Qty: 0 | Refills: 0 | Status: DISCONTINUED | OUTPATIENT
Start: 2017-11-14 | End: 2017-11-16

## 2017-11-14 RX ORDER — ASPIRIN/CALCIUM CARB/MAGNESIUM 324 MG
1 TABLET ORAL
Qty: 0 | Refills: 0 | COMMUNITY

## 2017-11-14 RX ORDER — VANCOMYCIN HCL 1 G
1000 VIAL (EA) INTRAVENOUS ONCE
Qty: 0 | Refills: 0 | Status: COMPLETED | OUTPATIENT
Start: 2017-11-14 | End: 2017-11-14

## 2017-11-14 RX ORDER — QUETIAPINE FUMARATE 200 MG/1
25 TABLET, FILM COATED ORAL AT BEDTIME
Qty: 0 | Refills: 0 | Status: DISCONTINUED | OUTPATIENT
Start: 2017-11-14 | End: 2017-11-16

## 2017-11-14 RX ORDER — SODIUM CHLORIDE 9 MG/ML
1000 INJECTION INTRAMUSCULAR; INTRAVENOUS; SUBCUTANEOUS ONCE
Qty: 0 | Refills: 0 | Status: COMPLETED | OUTPATIENT
Start: 2017-11-14 | End: 2017-11-14

## 2017-11-14 RX ORDER — CEFTRIAXONE 500 MG/1
1 INJECTION, POWDER, FOR SOLUTION INTRAMUSCULAR; INTRAVENOUS ONCE
Qty: 0 | Refills: 0 | Status: COMPLETED | OUTPATIENT
Start: 2017-11-14 | End: 2017-11-14

## 2017-11-14 RX ORDER — FLUOCINONIDE/EMOLLIENT BASE 0.05 %
1 CREAM (GRAM) TOPICAL DAILY
Qty: 0 | Refills: 0 | Status: DISCONTINUED | OUTPATIENT
Start: 2017-11-14 | End: 2017-11-16

## 2017-11-14 RX ORDER — METOPROLOL TARTRATE 50 MG
50 TABLET ORAL DAILY
Qty: 0 | Refills: 0 | Status: DISCONTINUED | OUTPATIENT
Start: 2017-11-14 | End: 2017-11-16

## 2017-11-14 RX ORDER — AMLODIPINE BESYLATE 2.5 MG/1
0 TABLET ORAL
Qty: 0 | Refills: 0 | COMMUNITY

## 2017-11-14 RX ORDER — ONDANSETRON 8 MG/1
4 TABLET, FILM COATED ORAL EVERY 6 HOURS
Qty: 0 | Refills: 0 | Status: DISCONTINUED | OUTPATIENT
Start: 2017-11-14 | End: 2017-11-16

## 2017-11-14 RX ADMIN — Medication 4: at 17:56

## 2017-11-14 RX ADMIN — SODIUM CHLORIDE 333.33 MILLILITER(S): 9 INJECTION INTRAMUSCULAR; INTRAVENOUS; SUBCUTANEOUS at 15:00

## 2017-11-14 RX ADMIN — ATORVASTATIN CALCIUM 20 MILLIGRAM(S): 80 TABLET, FILM COATED ORAL at 20:56

## 2017-11-14 RX ADMIN — Medication 12.5 MILLIGRAM(S): at 17:33

## 2017-11-14 RX ADMIN — VALSARTAN 80 MILLIGRAM(S): 80 TABLET ORAL at 17:34

## 2017-11-14 RX ADMIN — Medication 1 APPLICATION(S): at 17:32

## 2017-11-14 RX ADMIN — Medication 0.5 MILLIGRAM(S): at 20:56

## 2017-11-14 RX ADMIN — QUETIAPINE FUMARATE 25 MILLIGRAM(S): 200 TABLET, FILM COATED ORAL at 20:56

## 2017-11-14 RX ADMIN — CEFEPIME 100 MILLIGRAM(S): 1 INJECTION, POWDER, FOR SOLUTION INTRAMUSCULAR; INTRAVENOUS at 20:55

## 2017-11-14 RX ADMIN — Medication 250 MILLIGRAM(S): at 15:00

## 2017-11-14 RX ADMIN — AMLODIPINE BESYLATE 5 MILLIGRAM(S): 2.5 TABLET ORAL at 17:31

## 2017-11-14 RX ADMIN — CEFTRIAXONE 100 GRAM(S): 500 INJECTION, POWDER, FOR SOLUTION INTRAMUSCULAR; INTRAVENOUS at 13:53

## 2017-11-14 RX ADMIN — ENOXAPARIN SODIUM 40 MILLIGRAM(S): 100 INJECTION SUBCUTANEOUS at 17:51

## 2017-11-14 RX ADMIN — MUPIROCIN 1 APPLICATION(S): 20 OINTMENT TOPICAL at 17:34

## 2017-11-14 RX ADMIN — Medication 100 MILLIGRAM(S): at 17:32

## 2017-11-14 RX ADMIN — Medication 1 TABLET(S): at 17:33

## 2017-11-14 RX ADMIN — Medication 50 MILLIGRAM(S): at 17:33

## 2017-11-14 NOTE — ED STATDOCS - PROGRESS NOTE DETAILS
82 yr. old female PMH: Type 2 Diabetes, Dementia, presentss to ED with worsening pain in right 2nd toe. Patient family at bedside and reports she is here for pre op tests and admission to ED. Maria Esther nd examined by attending in Intake. Plan: IV, Labs, X-Ray, EKG, Chest X-Ray. Will F/U with results and re evaluate. Linda SAUCEDA 82 yr. old female PMH: Type 2 Diabetes, Dementia, presents to ED with worsening pain in right 2nd toe. Patient family at bedside and reports she is here for pre op tests and admission to ED. Seen and examined by attending in Intake. Plan: IV, Labs, X-Ray, EKG, Chest X-Ray. Will F/U with results and re evaluate. Linda SAUCEDA 82 yr. old female PMH: Type 2 Diabetes, Dementia, HTN, HLD presents to ED with worsening pain in right 2nd toe. Patient family at bedside and reports she is here for pre op tests and admission to ED. Seen and examined by attending in Intake. Plan: IV, Labs, X-Ray, EKG, Chest X-Ray. Will F/U with results and re evaluate. Linda SAUCEDA

## 2017-11-14 NOTE — ED ADULT TRIAGE NOTE - CHIEF COMPLAINT QUOTE
pt brought to ED by son, pt was sent by MD for possible toe amputation pt was on PO abx that she has now completed

## 2017-11-14 NOTE — ED ADULT NURSE REASSESSMENT NOTE - NS ED NURSE REASSESS COMMENT FT1
Podiatry at the bedside evaluating pt. Will continue to monitor for safety and comfort.
left heal ulcer that was dressed today before the hospital with silvadine cream.

## 2017-11-14 NOTE — H&P ADULT - NSHPPHYSICALEXAM_GEN_ALL_CORE
VITALS:  T(F): 97.8 (11-14-17 @ 14:56), Max: 97.8 (11-14-17 @ 10:39)  HR: 64 (11-14-17 @ 14:56) (64 - 65)  BP: 159/46 (11-14-17 @ 14:56) (155/54 - 159/46)  RR: 17 (11-14-17 @ 14:56) (17 - 17)  SpO2: 100% (11-14-17 @ 14:56) (100% - 100%)      PHYSICAL EXAM:    HEENT:  pupils equal and reactive, EOMI, no oropharyngeal lesions, erythema, exudates, oral thrush    NECK:   supple, no carotid bruits, no palpable lymph nodes, no thyromegaly    CV:  +S1, +S2, regular, no murmurs or rubs    RESP:   lungs with mild crackles at the left lung base, no wheezing, no rhonchi    BREAST:  not examined    GI:  abdomen soft, non-tender, non-distended, normal BS, no bruits, no abdominal masses, no palpable masses    RECTAL:  not examined    :  not examined    MSK:   normal muscle tone, no atrophy, no rigidity, no contractions    EXT:   no clubbing, no cyanosis, no edema, no calf pain, swelling or erythema, right 2nd toe ulcer over the over the distal metatarsal about 1cm in size, dry, mild erythema, no drainage, the toe is swollen diffusely.  Left heel superficial ulcer about 1cm in size, no drainage, no erythema    VASCULAR:  weak pulses in lower extremities bilaterally    NEURO:  AAOX3, no focal neurological deficits, follows all commands, able to move extremities spontaneously    SKIN:  no ulcers, lesions or rashes

## 2017-11-14 NOTE — ED STATDOCS - OBJECTIVE STATEMENT
81 y/o F with a PMhx of DM, dementia presents to the ED c/o worsening R 2nd toe pain. Pt family states that she is being sent in for pre op testing to receive toe amputation tomorrow night. Pt currently calm, sitting comfortably and denies any other acute c/o at this time. PMD Frances.

## 2017-11-14 NOTE — PROGRESS NOTE ADULT - ASSESSMENT
83 y/o F with a PMhx of DM, dementia, CABG, s/p 9/14 IMN left femur is seen and evaluated for:    1. Osteomyelitis, 2nd digit, right foot   2. Pain 2nd digit, right foot   3. PVD, LE B/L  4. DM   5. HAV B/L  6. 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 SARA Pitts  Reviewed TERRELL and PVR with Dr. Puri   Vascular clearance provided by Dr. Puri; appreciated the recommendation   X-ray of the right foot reviewed and discussed with Dr. Puri, the aid and the patient the treatment plan including but no limited to taking the patient to OR for the right 2nd infected toe amputation, risks, benefits and the healing course  Cleansed the R 2nd toe and Left heel ulcers with normal saline   Applied betadine to the R 2nd toe and bacitracin to the L heel. Dressed R 2nd toe and the L heel with gauze and Kirlex  Medical clearance: to be obtained; appreciated    Cardiology clearance: to be obtained; appreciated   Recommend continue IV antibiotics; appreciated   Medical management per medicine; appreciated   WBAT with assistance and surgical to the left foot   Plan for R 2nd toe amputation tomorrow   Podiatry will continue to follow while in house

## 2017-11-14 NOTE — ED ADULT NURSE NOTE - OBJECTIVE STATEMENT
dementia pt alert but confused, here as per pts aide, toe pain with a ulcer to the right foot, second toe.

## 2017-11-14 NOTE — H&P ADULT - NSHPLABSRESULTS_GEN_ALL_CORE
11.3   6.0   )-----------( 272      ( 14 Nov 2017 10:54 )             35.0     11-14    135  |  101  |  24<H>  ----------------------------<  236<H>  4.5   |  28  |  0.83    Ca    9.0      14 Nov 2017 10:54    TPro  7.4  /  Alb  3.4  /  TBili  0.4  /  DBili  x   /  AST  19  /  ALT  23  /  AlkPhos  125<H>  11-14    LIVER FUNCTIONS - ( 14 Nov 2017 10:54 )  Alb: 3.4 g/dL / Pro: 7.4 gm/dL / ALK PHOS: 125 U/L / ALT: 23 U/L / AST: 19 U/L / GGT: x           PT/INR - ( 14 Nov 2017 10:54 )   PT: 10.8 sec;   INR: 1.00 ratio      PTT - ( 14 Nov 2017 10:54 )  PTT:25.6 sec    EKG - Sinus kenan, normal axis, normal RWP, no acute ischemic changes, no change from old EKG 11.3   6.0   )-----------( 272      ( 14 Nov 2017 10:54 )             35.0     11-14    135  |  101  |  24<H>  ----------------------------<  236<H>  4.5   |  28  |  0.83    ESR 28    Ca    9.0      14 Nov 2017 10:54    TPro  7.4  /  Alb  3.4  /  TBili  0.4  /  DBili  x   /  AST  19  /  ALT  23  /  AlkPhos  125<H>  11-14    LIVER FUNCTIONS - ( 14 Nov 2017 10:54 )  Alb: 3.4 g/dL / Pro: 7.4 gm/dL / ALK PHOS: 125 U/L / ALT: 23 U/L / AST: 19 U/L / GGT: x           PT/INR - ( 14 Nov 2017 10:54 )   PT: 10.8 sec;   INR: 1.00 ratio      PTT - ( 14 Nov 2017 10:54 )  PTT:25.6 sec    EKG - Sinus kenan, normal axis, normal RWP, no acute ischemic changes, no change from old EKG    CXR - reviewed by me, portable, no acute infiltrates or effusions, + sternotomy wires and surgical clips, no change from 9/2017

## 2017-11-14 NOTE — H&P ADULT - ASSESSMENT
CHRONIC RIGHT SECOND TOE ULCER, PRESENT ON ADMISSION, COMPLICATED BY ACUTE OSTEOMYELITIS, FAILED OUTPATIENT ORAL ABXs.  BONE WAS EXPOSED AND REMOVED BY PODIATRY AND CULTURES WERE POSITIVE AS OUTPATIENT PER PODIATRY    CHRONIC LEFT HEEL ULCER, PRESENT ON ADMISSION, NO EVIDENCE OF INFECTION    CAD, S/P 3V CABG     TYPE 2 DIABETES MELLITUS    HYPERLIPIDEMIA    DEMENTIA       PLAN:  -  admit to inpatient, med-surg, hospitalist service  -  s/p Rocephin and Vanco in ED, will continue IV Cefepime  (?Hx of PCN allergy)  -  continue home medications except for oral hypoglycemic meds and ASA  -  check sugars qAC and HS  -  Humalog SSI  -  follow ESR and CRP post-sugery  -  Podiatry consult - Dr. Pitts  -  NPO except meds after MN  -  DVT prophylaxis - Lovenox and venodynes  -  repeat labs in am  -  DNR/DNI (confirmed with patient's son Esdras who is HCP).  Cell phone is [phone]  -  Given patient's age and medical comorbidities, patient is moderate risk for low risk podiatric surgery  -  she is currently medically optimized to proceed with foot surgery and there are no medical contraindications at this time  -  wound care consult  -  appreciate vascular consult with Dr. Puri    d/w patient, aide at the bedside, Dr. Pitts and ED nurse

## 2017-11-14 NOTE — H&P ADULT - HISTORY OF PRESENT ILLNESS
82 F with Hx of DM, CAD, HTN, Lipids was last admitted to  9/2017 with a mechanical fall where she sustained a left intertrochanteric fracture and underwent IM nailing by Dr. Stock.  Her hospitaol course was uncomplicated and she did well post-op.  She developed an ulcer over the right second toe for which she has been followed by Dr. Pitts from podiatry.  This was associated with swelling and redness and she was treated with oral ABXs.  She has been experiencing intermittent drainage from the ulcer and had exposed bone.  Case d/w Dr. Pitts and culture taken from the bone which was positive for infection.  Despite PO ABXs, she has experienced intermittent drainage and pain from the ulcer.  She was evaluated again by podiatry and plan was for right second toe amputation.  She was sent to the ED for admission and plan for surgery in am.      The patient denies any fevers, chills, shakes, nausea, vomiting, abdominal pain, exertional chest pain, shortness of breath or chest pressure.      In the ED, she was given IV Vanco and IV Rocephin.    PAST MEDICAL HISTORY:  HTN  Type 2 Diabetes Mellitus  Hx of CAD with MI about 13 years ago, s/p CABG  Dementia  Anemia of Chronic Disease  Hyperlipidemia  Hx of Pancreatitis 2008  Hx of MDR UTI  s/p Mechanical Fall 9/2017    No Hx of CVA, CKD, PAULINO, Asthma, COPD, DVT/PE, CHF, Arrythmias    PAST SURGICAL HISTORY:  s/p 3V CABG about 13 years ago  s/p Left Hip IM nailing 9/2017    FAMILY HISTORY:   non-contributory to the patient's current presentation 82 F with Hx of DM, CAD, HTN, Lipids was last admitted to  9/2017 with a mechanical fall where she sustained a left intertrochanteric fracture and underwent IM nailing by Dr. Stock.  Her hospitaol course was uncomplicated and she did well post-op.  She developed an ulcer over the right second toe for which she has been followed by Dr. Pitts from podiatry.  This was associated with swelling and redness and she was treated with oral ABXs.  She has been experiencing intermittent drainage from the ulcer and had exposed bone.  Case d/w Dr. Pitts and culture taken from the bone which was positive for infection.  Despite PO ABXs, she has experienced intermittent drainage and pain from the ulcer.  She was evaluated again by podiatry and plan was for right second toe amputation.  She was sent to the ED for admission and plan for surgery in am.      The patient denies any fevers, chills, shakes, nausea, vomiting, abdominal pain, exertional chest pain, shortness of breath or chest pressure.      In the ED, she was given IV Vanco and IV Rocephin.    PAST MEDICAL HISTORY:  HTN  Type 2 Diabetes Mellitus  Hx of CAD with MI about 13 years ago, s/p CABG  Dementia  Anemia of Chronic Disease  Hyperlipidemia  Hx of Pancreatitis 2008  Hx of MDR UTI  Chronic Left Heel Ulcer  Chronic Right 2nd Toe ulcer  s/p Mechanical Fall 9/2017    No Hx of CVA, CKD, PAULINO, Asthma, COPD, DVT/PE, CHF, Arrythmias    PAST SURGICAL HISTORY:  s/p 3V CABG about 13 years ago  s/p Left Hip IM nailing 9/2017    FAMILY HISTORY:   non-contributory to the patient's current presentation

## 2017-11-14 NOTE — CONSULT NOTE ADULT - ASSESSMENT
83 y/o F with a PMhx of DM, dementia, CABG, s/p 9/14 IMN left femur is seen and evaluated for:    1. Osteomyelitis, 2nd digit, right foot   2. Pain 2nd digit, right foot   3. Superficial ulcer, left heel   4. PVD, LE B/L  5. DM   6. HAV B/L  7. 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 SARA Pitts  Reviewed TERRELL and PVR with Dr. Puri   Vascular clearance provided by Dr. Puri; appreciated the recommendation   X-ray of the right foot reviewed and discussed with Dr. Puri, the aid and the patient the treatment plan including but no limited to taking the patient to OR for the right 2nd infected toe amputation, risks, benefits and the healing course  Cleansed the R 2nd toe and Left heel ulcers with normal saline   Applied betadine to the R 2nd toe and bacitracin to the L heel. Dressed R 2nd toe and the L heel with gauze and Kirlex  Medical clearance: to be obtained; appreciate  Cardiology clearance: to be obtained; appreciate   Recommend continue IV antibiotics; appreciate  Medical management per medicine; appreciate   WBAT with assistance and surgical to the left foot   Recommend Z-flex boot to offload the heels BL  Plan for R 2nd toe amputation tomorrow   Podiatry will continue to follow while in house 83 y/o F with a PMhx of DM, dementia, CABG, s/p 9/14 IMN left femur is seen and evaluated for:    1. Osteomyelitis, 2nd digit, right foot   2. Pain 2nd digit, right foot   3. Superficial ulcer, left heel   4. PVD, LE B/L  5. DM   6. HAV B/L  7. 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 SARA Pitts  Reviewed TERRELL and PVR with Dr. Puri   Vascular clearance provided by Dr. Puri; appreciated the recommendation   X-ray of the right foot reviewed and discussed with Dr. Puri, the aid and the patient the treatment plan including but no limited to taking the patient to OR for the right 2nd infected toe amputation, risks, benefits and the healing course  Cleansed the R 2nd toe and Left heel ulcers with normal saline   Applied betadine to the R 2nd toe and bacitracin to the L heel. Dressed R 2nd toe and the L heel with gauze and Kirlex  Medical clearance: to be obtained; appreciate  Cardiology clearance: to be obtained; appreciate   Recommend continue IV antibiotics; appreciate  Medical management per medicine; appreciate   WBAT with assistance and surgical to the left foot   Recommend Z-flex boot to offload the heels BL  Plan for R 2nd toe amputation tomorrow   NPO after midnight 11:59pm 11/14/2017  Podiatry will continue to follow while in house 83 y/o F with a PMhx of DM, dementia, CABG, s/p 9/14 IMN left femur is seen and evaluated for:    1. Osteomyelitis, 2nd digit, right foot   2. Pain 2nd digit, right foot   3. Superficial ulcer, left heel   4. PVD, LE B/L  5. DM   6. HAV B/L  7. 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 SARA Pitts  Reviewed TERRELL and PVR with Dr. Puri   Vascular clearance provided by Dr. Puri; appreciate the recommendation   X-ray of the right foot reviewed and discussed with Dr. Puri, the aid and the patient the treatment plan including but no limited to taking the patient to OR for the right 2nd infected toe amputation, risks, benefits and the healing course  Cleansed the R 2nd toe and Left heel ulcers with normal saline   Applied betadine to the R 2nd toe and bacitracin to the L heel. Dressed R 2nd toe and the L heel with gauze and Kirlex  Medical clearance: to be obtained; appreciated  Cardiology clearance: to be obtained; appreciated   Recommend continue IV antibiotics; appreciated  Medical management per medicine; appreciated   WBAT with assistance and surgical to the right foot   Recommend Z-flex boot to offload the heels BL  Plan for R 2nd toe amputation tomorrow   NPO after midnight 11:59pm 11/14/2017  Podiatry will continue to follow while in house 83 y/o F with a PMhx of DM, dementia, CABG, s/p 9/14 IMN left femur is seen and evaluated for:    1. Osteomyelitis, 2nd digit, right foot   2. Pain 2nd digit, right foot   3. Superficial ulcer, left heel   4. PVD, LE B/L  5. DM   6. HAV B/L  7. 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 SARA Pitts  Reviewed TERRELL and PVR with Dr. Puri   Vascular clearance provided by Dr. Puri; appreciate the recommendation   X-ray of the right foot reviewed and discussed with Dr. Puri, the aid and the patient the treatment plan including but no limited to taking the patient to OR for the right 2nd infected toe amputation, risks, benefits and the healing course  Cleansed the R 2nd toe and Left heel ulcers with normal saline   Applied betadine to the R 2nd toe and bacitracin to the L heel. Dressed R 2nd toe and the L heel with gauze and Kirlex  Medical clearance: to be obtained; appreciated  Cardiology clearance: to be obtained; appreciated   Recommend continue IV antibiotics; appreciated  Medical management per medicine; appreciated   WBAT with assistance and surgical shoe to the right foot   Recommend Z-flex boot to offload the heels BL  Plan for R 2nd toe amputation tomorrow   NPO after midnight 11:59pm 11/14/2017  Podiatry will continue to follow while in house

## 2017-11-14 NOTE — CONSULT NOTE ADULT - SUBJECTIVE AND OBJECTIVE BOX
VASCULAR SURGERY CONSULT NOTE  --------------------------------------------------------------------------------------------    Patient is a 82y old  Female who presents with a chief complaint of right 2nd toe ulceration and osteomyelitis    HPI: Lives at home with caregiver, ambulatory around the house, no claudication symptoms. Has ulceration on right 2nd hammer toe, concerning for clinical osteomyelitis. No other tissue loss, no gangrene.    Patient denies fevers/chills, denies lightheadedness/dizziness, denies SOB/chest pain, denies nausea/vomiting, denies constipation/diarrhea.      ROS: 10-system review is otherwise negative except HPI above.      PAST MEDICAL & SURGICAL HISTORY:  CABG  Hip Fx ORIF    FAMILY HISTORY:  HTN, CAD    SOCIAL HISTORY:  no EtOH, No smoking    ALLERGIES: No Known Allergies      HOME MEDICATIONS:  see list    CURRENT MEDICATIONS  MEDICATIONS (STANDING): sodium chloride 0.9% Bolus 1000 milliLiter(s) IV Bolus once  vancomycin  IVPB 1000 milliGRAM(s) IV Intermittent once    MEDICATIONS (PRN):  --------------------------------------------------------------------------------------------    Vitals:   T(C): 36.6 (11-14-17 @ 10:39), Max: 36.6 (11-14-17 @ 10:39)  HR: 65 (11-14-17 @ 10:39) (65 - 65)  BP: 155/54 (11-14-17 @ 10:39) (155/54 - 155/54)  BP(mean): --  RR: 17 (11-14-17 @ 10:39) (17 - 17)  SpO2: 100% (11-14-17 @ 10:39) (100% - 100%)  Wt(kg): --  CAPILLARY BLOOD GLUCOSE        CAPILLARY BLOOD GLUCOSE              PHYSICAL EXAM: ***  General: NAD, Lying in bed comfortably  Neuro: A+Ox3  HEENT: NC/AT, EOMI  Neck: Soft, supple  Cardio: RRR, nml S1/S2  Resp: Good effort, CTA b/l  Thorax: No chest wall tenderness  Breast: No lesions/masses, no drainage  GI/Abd: Soft, NT/ND, no rebound/guarding, no masses palpated  Vascular: All 4 extremities warm, B/l radial pulses palpable,   b/l DP/PT with doppler signals at b/l DP/PT, no palpable pulsatile abdominal mass.   Skin: Intact, no breakdown except right 2nd toe  Lymphatic/Nodes: No palpable lymphadenopathy  Musculoskeletal: All 4 extremities moving spontaneously, no limitations.  Wound on right 2nd toe probes to bone  --------------------------------------------------------------------------------------------    LABS  CBC (11-14 @ 10:54)                              11.3<L>                         6.0     )----------------(  272        68.6  % Neutrophils, 19.7  % Lymphocytes, ANC: 4.1                                 35.0      BMP (11-14 @ 10:54)             135     |  101     |  24<H> 		Ca++ --      Ca 9.0                ---------------------------------( 236<H>		Mg --                 4.5     |  28      |  0.83  			Ph --        LFTs (11-14 @ 10:54)      TPro 7.4 / Alb 3.4 / TBili 0.4 / DBili -- / AST 19 / ALT 23 / AlkPhos 125<H>    Coags (11-14 @ 10:54)  aPTT 25.6<L> / INR 1.00 / PT 10.8          --------------------------------------------------------------------------------------------    MICROBIOLOGY      --------------------------------------------------------------------------------------------    IMAGING      ASSESSMENT: Patient is an 82y old  Female who presents with a chief complaint of right 2nd toe ulceration and osteomyelitis    HPI: Lives at home with caregiver, ambulatory around the house, no claudication symptoms. Has ulceration on right 2nd hammer toe, concerning for clinical osteomyelitis. No other tissue loss, no gangrene.    Patient denies fevers/chills, denies lightheadedness/dizziness, denies SOB/chest pain, denies nausea/vomiting, denies constipation/diarrhea.     Based on outpatient TERRELL/PVR and exam, patient has no vascular contraindication to toe amputation per podiatry.    PLAN:   - Continue antibiotics  - Care per podiatry  - Cleared from vascular standpoint for toe amputation  - Patient seen and examined by me  - Plan discussed with Podiatry resident and Dr. Pitts

## 2017-11-14 NOTE — H&P ADULT - NSHPSOCIALHISTORY_GEN_ALL_CORE
no smoking, no alcohol, no drugs, lives alone in basement 1 floor apartment, has a 24 hour aide, does not use any assistive devices for ambulation

## 2017-11-14 NOTE — CONSULT NOTE ADULT - SUBJECTIVE AND OBJECTIVE BOX
HPI: 83 y/o F with a PMhx of DM, dementia, CABG, s/p 9/14 IMN left femur is seen and evaluated at the ED for chronic ulcer right 2nd toe. Pt is a poor historian. The pt aid give HPI during the evaluation. The aid states the patient is sent by Dr. SARA Pitts for pre-op testing for right 2nd toe amputation tomorrow. The aid states the pt has been having the ulcer over a year and the toe was infected and the pt received oral antibiotics two weeks ago for 3 to 4 days. The aid states the toe got less swollen during the antibiotics treatment and then it is worsening after the antibiotics. The aid states there was mixed blood and cloudy drainage two weeks ago. The patient has been seen by Dr. SARA Pitts regularly. The aid denies the patient has fever, chills, nausea, vomiting, SOB, chest pain. The patient has outpatient biopsy done which shows osteomyelitis of the right 2nd toe.     REVIEW OF SYSTEMS: all other review of systems are negative except as in HPI    Allergies: No Known Allergies    MEDICATIONS  (STANDING):  sodium chloride 0.9% Bolus 1000 milliLiter(s) IV Bolus once  vancomycin  IVPB 1000 milliGRAM(s) IV Intermittent once      PAST MEDICAL & SURGICAL HISTORY:  Advanced Dementia  DM Type II  Hx of MDR UTI (hospitalized in 2016)  Anemia of Chronic Disease - longstanding  HTN / HLD  S/p CABG (13 years ago)  IMN left femur (9/14/17)    SOCIAL HISTORY: no EtOH, No smoking      FAMILY HISTORY: HTN, CAD      VITALS:  Vital Signs Last 24 Hrs  T(C): 36.6 (11-14-17 @ 10:39), Max: 36.6 (11-14-17 @ 10:39)  T(F): 97.8 (11-14-17 @ 10:39), Max: 97.8 (11-14-17 @ 10:39)  HR: 65 (11-14-17 @ 10:39) (65 - 65)  BP: 155/54 (11-14-17 @ 10:39) (155/54 - 155/54)  BP(mean): --  RR: 17 (11-14-17 @ 10:39) (17 - 17)  SpO2: 100% (11-14-17 @ 10:39) (100% - 100%)        PHYSICAL EXAM:  Constitutional: NAD, comfortable  Lower extremity exam:  Derm:   Left foot: Dressing is dry, clean and intact. Upon removal of the dressing, full thickness ulcer noted at the PIPJ with granulating wound base, probe deep, no-malodor, no purulence, no active drainage ( negative serous, negative sanguinis), no fluctuance; periwound: (+) erythema, (+) edema. The remaining of the foot has no open lesion, no clinical signs of infection. No interdigital maceration, no fissuring. Nails are dystrophic and discolor x5.   Right foot: Dressing is dry, clean and intact. Upon removal of the dressing, superficial ulcer noted at the posterior aspect of the heel; no purulence, no active drainage ( negative serous, negative sanguinis), no fluctuance, no malodor, no probe to bone, mild erythema, and edema. No interdigital maceration, or fissuring. Nails are dystrophic and discolor x5.   Vasc:  Pedal pulses DP is palpable 1/4 B/L and PT is non-palpable B/L. CFT immediate in all toes x 10. Pedal hair absent. Varicosity noted at the dorsal aspect of the foot b/l. +1 pitting edema at the LLE.   Doppler ultrasound: strong regular biphasic at DP b/l and regular monophasic at PT b/l.   Neuro:   light touch sensation grossly intact B/L  Ortho:  Tenderness upon palpation to the ulcer at the right PIPJ of the 2nd toe. Reduced ROM of the right PIPJ of the 2nd toe. (-) blevins's and Ross's signs. LE compartments are soft and non-tender.   HAV B/L with Hallux underlaping the 2nd toe. Hammer 2nd toes B/L.     LABS:                          11.3   6.0   )-----------( 272      ( 14 Nov 2017 10:54 )             35.0       11-14    135  |  101  |  24<H>  ----------------------------<  236<H>  4.5   |  28  |  0.83    Ca    9.0      14 Nov 2017 10:54    TPro  7.4  /  Alb  3.4  /  TBili  0.4  /  DBili  x   /  AST  19  /  ALT  23  /  AlkPhos  125<H>  11-14      PT/INR - ( 14 Nov 2017 10:54 )   PT: 10.8 sec;   INR: 1.00 ratio         PTT - ( 14 Nov 2017 10:54 )  PTT:25.6 sec      RADIOLOGY:  Chest X-ray (11/14): pending  Left foot 3 views Xray (11/14): official report pending.   Initial podiatry reading: soft tissue swelling and increase in soft tissue density at the R 2nd toe. Diffused Osteopenia. R 2nd toe cortex appear intact; however the reading is limited due to toes overlap. Vascular calcification. HPI: 81 y/o F with a PMhx of DM, dementia, CABG, s/p 9/14 IMN left femur is seen and evaluated at the ED for chronic ulcer right 2nd toe. Pt is a poor historian. The pt aid give HPI during the evaluation. The aid states the patient is sent by Dr. SARA Pitts for pre-op testing for right 2nd toe amputation tomorrow. The aid states the pt has been having the ulcer over a year and the toe was infected and the pt received oral antibiotics two weeks ago for 3 to 4 days. The aid states the toe got less swollen during the antibiotics treatment and then it is worsening after the antibiotics. The aid states there was mixed blood and cloudy drainage two weeks ago. The patient has been seen by Dr. SARA Pitts regularly. The aid denies the patient has fever, chills, nausea, vomiting, SOB, chest pain. The patient has outpatient biopsy done which shows osteomyelitis of the right 2nd toe.     REVIEW OF SYSTEMS: all other review of systems are negative except as in HPI    Allergies: No Known Allergies    MEDICATIONS  (STANDING):  sodium chloride 0.9% Bolus 1000 milliLiter(s) IV Bolus once  vancomycin  IVPB 1000 milliGRAM(s) IV Intermittent once      PAST MEDICAL & SURGICAL HISTORY:  Advanced Dementia  DM Type II  Hx of MDR UTI (hospitalized in 2016)  Anemia of Chronic Disease - longstanding  HTN / HLD  S/p CABG (13 years ago)  IMN left femur (9/14/17)    SOCIAL HISTORY: no EtOH, No smoking      FAMILY HISTORY: HTN, CAD      VITALS:  Vital Signs Last 24 Hrs  T(C): 36.6 (11-14-17 @ 10:39), Max: 36.6 (11-14-17 @ 10:39)  T(F): 97.8 (11-14-17 @ 10:39), Max: 97.8 (11-14-17 @ 10:39)  HR: 65 (11-14-17 @ 10:39) (65 - 65)  BP: 155/54 (11-14-17 @ 10:39) (155/54 - 155/54)  BP(mean): --  RR: 17 (11-14-17 @ 10:39) (17 - 17)  SpO2: 100% (11-14-17 @ 10:39) (100% - 100%)        PHYSICAL EXAM:  Constitutional: NAD, comfortable  Lower extremity exam:  Derm:   Right foot: Dressing is dry, clean and intact. Upon removal of the dressing, full thickness ulcer noted at the PIPJ with granulating wound base, probe deep, no-malodor, no purulence, no active drainage ( negative serous, negative sanguinis), no fluctuance; periwound: (+) erythema, (+) edema. The remaining of the foot has no open lesion, no clinical signs of infection. No interdigital maceration, no fissuring. Nails are dystrophic and discolor x5.   Left foot: Dressing is dry, clean and intact. Upon removal of the dressing, superficial ulcer noted at the posterior aspect of the heel; no purulence, no active drainage ( negative serous, negative sanguinis), no fluctuance, no malodor, no probe to bone, mild erythema, and edema. No interdigital maceration, or fissuring. Nails are dystrophic and discolor x5.   Vasc:  Pedal pulses DP is palpable 1/4 B/L and PT is non-palpable B/L. CFT immediate in all toes x 10. Pedal hair absent. Varicosity noted at the dorsal aspect of the foot b/l. +1 pitting edema at the LLE.   Doppler ultrasound: strong regular biphasic at DP b/l and regular monophasic at PT b/l.   Neuro:   light touch sensation grossly intact B/L  Ortho:  Tenderness upon palpation to the ulcer at the right PIPJ of the 2nd toe. Reduced ROM of the right PIPJ of the 2nd toe. (-) blevins's and Ross's signs. LE compartments are soft and non-tender.   HAV B/L with Hallux underlaping the 2nd toe. Hammer 2nd toes B/L.     LABS:                          11.3   6.0   )-----------( 272      ( 14 Nov 2017 10:54 )             35.0       11-14    135  |  101  |  24<H>  ----------------------------<  236<H>  4.5   |  28  |  0.83    Ca    9.0      14 Nov 2017 10:54    TPro  7.4  /  Alb  3.4  /  TBili  0.4  /  DBili  x   /  AST  19  /  ALT  23  /  AlkPhos  125<H>  11-14      PT/INR - ( 14 Nov 2017 10:54 )   PT: 10.8 sec;   INR: 1.00 ratio         PTT - ( 14 Nov 2017 10:54 )  PTT:25.6 sec      RADIOLOGY:  Chest X-ray (11/14): pending  Left foot 3 views Xray (11/14): official report pending.   Initial podiatry reading: soft tissue swelling and increase in soft tissue density at the R 2nd toe. Diffused Osteopenia. R 2nd toe cortex appear intact; however the reading is limited due to toes overlap. Vascular calcification.

## 2017-11-14 NOTE — ED STATDOCS - ATTENDING CONTRIBUTION TO CARE
Attending Contribution to Care: I, Shaila Walton, performed the initial face to face bedside interview with this patient regarding history of present illness, review of symptoms and relevant past medical, social and family history.  I completed an independent physical examination.  I was the initial provider who evaluated this patient and the history, physical, and MDM reflect this intial assessment. I have signed out the follow up of any pending tests after the original (i.e. labs, radiological studies) to the ACP with instructions to review any with instructions to review any concerning findings to me prior to discharge.  I have communicated the patient’s plan of care and disposition with the ACP.

## 2017-11-15 DIAGNOSIS — M86.9 OSTEOMYELITIS, UNSPECIFIED: ICD-10-CM

## 2017-11-15 DIAGNOSIS — Z01.810 ENCOUNTER FOR PREPROCEDURAL CARDIOVASCULAR EXAMINATION: ICD-10-CM

## 2017-11-15 DIAGNOSIS — I10 ESSENTIAL (PRIMARY) HYPERTENSION: ICD-10-CM

## 2017-11-15 DIAGNOSIS — I25.10 ATHEROSCLEROTIC HEART DISEASE OF NATIVE CORONARY ARTERY WITHOUT ANGINA PECTORIS: ICD-10-CM

## 2017-11-15 LAB
ANION GAP SERPL CALC-SCNC: 7 MMOL/L — SIGNIFICANT CHANGE UP (ref 5–17)
BUN SERPL-MCNC: 18 MG/DL — SIGNIFICANT CHANGE UP (ref 7–23)
CALCIUM SERPL-MCNC: 8.8 MG/DL — SIGNIFICANT CHANGE UP (ref 8.5–10.1)
CHLORIDE SERPL-SCNC: 103 MMOL/L — SIGNIFICANT CHANGE UP (ref 96–108)
CO2 SERPL-SCNC: 30 MMOL/L — SIGNIFICANT CHANGE UP (ref 22–31)
CREAT SERPL-MCNC: 0.62 MG/DL — SIGNIFICANT CHANGE UP (ref 0.5–1.3)
CRP SERPL-MCNC: 0.4 MG/DL — SIGNIFICANT CHANGE UP (ref 0–0.4)
GLUCOSE BLDC GLUCOMTR-MCNC: 159 MG/DL — HIGH (ref 70–99)
GLUCOSE BLDC GLUCOMTR-MCNC: 207 MG/DL — HIGH (ref 70–99)
GLUCOSE BLDC GLUCOMTR-MCNC: 231 MG/DL — HIGH (ref 70–99)
GLUCOSE BLDC GLUCOMTR-MCNC: 92 MG/DL — SIGNIFICANT CHANGE UP (ref 70–99)
GLUCOSE BLDC GLUCOMTR-MCNC: 94 MG/DL — SIGNIFICANT CHANGE UP (ref 70–99)
GLUCOSE SERPL-MCNC: 126 MG/DL — HIGH (ref 70–99)
HBA1C BLD-MCNC: 7.5 % — HIGH (ref 4–5.6)
HCT VFR BLD CALC: 32.7 % — LOW (ref 34.5–45)
HGB BLD-MCNC: 10.8 G/DL — LOW (ref 11.5–15.5)
MCHC RBC-ENTMCNC: 30.3 PG — SIGNIFICANT CHANGE UP (ref 27–34)
MCHC RBC-ENTMCNC: 32.9 GM/DL — SIGNIFICANT CHANGE UP (ref 32–36)
MCV RBC AUTO: 92.1 FL — SIGNIFICANT CHANGE UP (ref 80–100)
PLATELET # BLD AUTO: 241 K/UL — SIGNIFICANT CHANGE UP (ref 150–400)
POTASSIUM SERPL-MCNC: 3.9 MMOL/L — SIGNIFICANT CHANGE UP (ref 3.5–5.3)
POTASSIUM SERPL-SCNC: 3.9 MMOL/L — SIGNIFICANT CHANGE UP (ref 3.5–5.3)
RBC # BLD: 3.55 M/UL — LOW (ref 3.8–5.2)
RBC # FLD: 12.5 % — SIGNIFICANT CHANGE UP (ref 10.3–14.5)
SODIUM SERPL-SCNC: 140 MMOL/L — SIGNIFICANT CHANGE UP (ref 135–145)
WBC # BLD: 4 K/UL — SIGNIFICANT CHANGE UP (ref 3.8–10.5)
WBC # FLD AUTO: 4 K/UL — SIGNIFICANT CHANGE UP (ref 3.8–10.5)

## 2017-11-15 PROCEDURE — 88311 DECALCIFY TISSUE: CPT | Mod: 26

## 2017-11-15 PROCEDURE — 88305 TISSUE EXAM BY PATHOLOGIST: CPT | Mod: 26

## 2017-11-15 PROCEDURE — 99223 1ST HOSP IP/OBS HIGH 75: CPT

## 2017-11-15 PROCEDURE — 88304 TISSUE EXAM BY PATHOLOGIST: CPT | Mod: 26

## 2017-11-15 RX ORDER — ACETAMINOPHEN 500 MG
1000 TABLET ORAL ONCE
Qty: 0 | Refills: 0 | Status: COMPLETED | OUTPATIENT
Start: 2017-11-15 | End: 2017-11-15

## 2017-11-15 RX ORDER — SODIUM CHLORIDE 9 MG/ML
1000 INJECTION INTRAMUSCULAR; INTRAVENOUS; SUBCUTANEOUS
Qty: 0 | Refills: 0 | Status: DISCONTINUED | OUTPATIENT
Start: 2017-11-15 | End: 2017-11-16

## 2017-11-15 RX ORDER — FENTANYL CITRATE 50 UG/ML
25 INJECTION INTRAVENOUS
Qty: 0 | Refills: 0 | Status: DISCONTINUED | OUTPATIENT
Start: 2017-11-15 | End: 2017-11-16

## 2017-11-15 RX ADMIN — CEFEPIME 100 MILLIGRAM(S): 1 INJECTION, POWDER, FOR SOLUTION INTRAMUSCULAR; INTRAVENOUS at 05:09

## 2017-11-15 RX ADMIN — Medication 12.5 MILLIGRAM(S): at 05:09

## 2017-11-15 RX ADMIN — Medication 400 MILLIGRAM(S): at 21:00

## 2017-11-15 RX ADMIN — ATORVASTATIN CALCIUM 20 MILLIGRAM(S): 80 TABLET, FILM COATED ORAL at 21:42

## 2017-11-15 RX ADMIN — QUETIAPINE FUMARATE 25 MILLIGRAM(S): 200 TABLET, FILM COATED ORAL at 21:42

## 2017-11-15 RX ADMIN — CEFEPIME 100 MILLIGRAM(S): 1 INJECTION, POWDER, FOR SOLUTION INTRAMUSCULAR; INTRAVENOUS at 17:28

## 2017-11-15 RX ADMIN — Medication 2: at 17:34

## 2017-11-15 RX ADMIN — Medication 1 APPLICATION(S): at 11:14

## 2017-11-15 RX ADMIN — Medication 0.5 MILLIGRAM(S): at 21:42

## 2017-11-15 RX ADMIN — SODIUM CHLORIDE 75 MILLILITER(S): 9 INJECTION, SOLUTION INTRAVENOUS at 05:12

## 2017-11-15 RX ADMIN — MUPIROCIN 1 APPLICATION(S): 20 OINTMENT TOPICAL at 12:09

## 2017-11-15 RX ADMIN — AMLODIPINE BESYLATE 5 MILLIGRAM(S): 2.5 TABLET ORAL at 05:09

## 2017-11-15 RX ADMIN — SODIUM CHLORIDE 75 MILLILITER(S): 9 INJECTION INTRAMUSCULAR; INTRAVENOUS; SUBCUTANEOUS at 21:42

## 2017-11-15 RX ADMIN — VALSARTAN 80 MILLIGRAM(S): 80 TABLET ORAL at 05:09

## 2017-11-15 RX ADMIN — Medication 50 MILLIGRAM(S): at 05:09

## 2017-11-15 RX ADMIN — Medication 4: at 11:52

## 2017-11-15 NOTE — BRIEF OPERATIVE NOTE - PROCEDURE
<<-----Click on this checkbox to enter Procedure Toe amputation, right, single toe  11/15/2017  2nd right toe amputation  Active  [place] [id number]

## 2017-11-15 NOTE — BRIEF OPERATIVE NOTE - POST-OP DX
Osteomyelitis of toe of right foot  11/15/2017  osteomyelitis of 2nd toe, right foot  Active  Yaritza Pinon

## 2017-11-15 NOTE — CONSULT NOTE ADULT - SUBJECTIVE AND OBJECTIVE BOX
CHIEF COMPLAINT: Patient is a 82y old  Female who presents with a chief complaint of right second toe infection (14 Nov 2017 15:20)    HPI:  82 year old woman with a history of CAD s/p remote CABG, DM, HTN, HLD, recent hip fracture s/p uncomplicated surgical repair admitted 11/14/17 with right foot 2nd digit osteomyelitis and is scheduled for operative debridement / amputation.  Her toe infection has been associated with swelling, erythema purulent drainage, and pain - did not improve with antibiotics.  She has been doing well from a cardiac standpoint - sees Dr. Perez regularly.  The patient is a poor informant - I spoke to her daughter-in-law.  Mrs. Raines has been doing well from  a cardiac standpoint and has not had any recent decompensation  She is an active woman - walks, climbs stairs, etc; she has not been experiencing angina.    PAST MEDICAL HISTORY:  HTN  Type 2 Diabetes Mellitus  Hx of CAD ~  13 years ago, s/p CABG (** family denies prior MI)  Dementia  Anemia of Chronic Disease  Hyperlipidemia  Hx of Pancreatitis 2008  Hx of MDR UTI  Chronic Left Heel Ulcer  Chronic Right 2nd Toe ulcer  s/p Mechanical Fall 9/2017    PAST SURGICAL HISTORY:  s/p 3V CABG about 13 years ago  s/p Left Hip IM nailing 9/2017    FAMILY HISTORY:   non-contributory to the patient's current presentation (14 Nov 2017 15:20)    SOCIAL HISTORY:   Smoking: Nonsmoker  Lives in a home with family    MEDICATIONS  (STANDING):  amLODIPine   Tablet 5 milliGRAM(s) Oral daily  atorvastatin 20 milliGRAM(s) Oral at bedtime  cefepime  IVPB 1000 milliGRAM(s) IV Intermittent every 12 hours  docusate sodium 100 milliGRAM(s) Oral daily  enoxaparin Injectable 40 milliGRAM(s) SubCutaneous every 24 hours  fluocinonide 0.05% Cream 1 Application(s) Topical daily  hydrochlorothiazide 12.5 milliGRAM(s) Oral daily  insulin lispro (HumaLOG) corrective regimen sliding scale   SubCutaneous three times a day before meals  insulin lispro (HumaLOG) corrective regimen sliding scale   SubCutaneous at bedtime  LORazepam     Tablet 0.5 milliGRAM(s) Oral at bedtime  metoprolol succinate ER 50 milliGRAM(s) Oral daily  multivitamin 1 Tablet(s) Oral daily  mupirocin 2% Ointment 1 Application(s) Topical daily  QUEtiapine 25 milliGRAM(s) Oral at bedtime  sodium chloride 0.45%. 1000 milliLiter(s) (75 mL/Hr) IV Continuous <Continuous>  valsartan 80 milliGRAM(s) Oral daily    Allergies: No Known Allergies    REVIEW OF SYSTEMS:   * Limited -- poor informant; hard-of-hearing  CONSTITUTIONAL: No fever  EYES/ENT: hard-of-hearing  NECK: No pain  RESPIRATORY: No shortness of breath  CARDIOVASCULAR: No chest pain  GASTROINTESTINAL: No abdominal pain.  GENITOURINARY: No dysuria  NEUROLOGICAL: No numbness  SKIN: See HPI  All other review of systems is negative unless indicated above    VITAL SIGNS:   Vital Signs Last 24 Hrs  T(C): 36.6 (15 Nov 2017 11:16), Max: 36.8 (15 Nov 2017 05:09)  T(F): 97.8 (15 Nov 2017 11:16), Max: 98.2 (15 Nov 2017 05:09)  HR: 67 (15 Nov 2017 11:16) (64 - 67)  BP: 130/41 (15 Nov 2017 11:16) (130/41 - 159/46)  BP(mean): 78 (14 Nov 2017 16:34) (74 - 78)  RR: 18 (15 Nov 2017 11:16) (17 - 18)  SpO2: 99% (15 Nov 2017 11:16) (99% - 100%)    PHYSICAL EXAM:  Constitutional: Elderly woman, appears stated age, observed ambulating with assistance to hallway chair  Eyes:  Pupils round, No oral cyanosis.  Pulmonary: Non-labored, breath sounds are clear bilaterally, No wheezing, rales or rhonchi  Cardiovascular: Normal S1 and S2, regular rate, I/VI systolic murmur  Gastrointestinal: Bowel Sounds present, soft, nontender.   Lymph: No pedal edema. No cervical lymphadenopathy  Skin: No rash; foot bandaged  Psych:  Mood & affect appropriate    LABS:                        10.8   4.0   )-----------( 241      ( 15 Nov 2017 07:04 )             32.7              140    |  103    |  18     ----------------------------<  126    3.9     |  30     |  0.62     PT/INR - ( 14 Nov 2017 10:54 )   PT: 10.8 sec;   INR: 1.00 ratio    PTT - ( 14 Nov 2017 10:54 )  PTT:25.6 sec    Xray Chest 1 View AP/PA. (11.14.17 @ 11:16): Chest: Clear lungs.    12 Lead ECG (11.14.17 @ 11:20):  Normal sinus rhythm with sinus arrhythmia

## 2017-11-15 NOTE — CONSULT NOTE ADULT - PROBLEM SELECTOR RECOMMENDATION 3
Stable and without angina; continue medical therapy with metoprolol, atorvastatin; resume aspirin 11/16/17.

## 2017-11-15 NOTE — PROGRESS NOTE ADULT - ASSESSMENT
83 y/o F with a PMhx of DM, dementia, CABG, s/p 9/14 IMN left femur is seen and evaluated for:    1. Osteomyelitis, 2nd digit, right foot   2. Pain 2nd digit, right foot   3. Superficial ulcer, left heel   4. PVD, LE B/L  5. DM   6. HAV B/L  7. 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 SARA Pitts  Vascular clearance provided by Dr. Puri; appreciated  Medical clearance provided by Dr. Fung; appreciated   X-ray of the right foot reviewed and discussed (on 11/14/17) with Dr. Puri, the aid and the patient on the treatment plan including but no limited to taking the patient to OR for the right 2nd infected toe amputation, risks, benefits and the healing course  Cleansed the R 2nd toe and Left heel ulcers with normal saline   Applied Bactroban to the L heel follow by Allevyn border.   Dressed R 2nd toe with gauze and Amol  Cardiology clearance: to be obtained; appreciated   Recommend continue IV antibiotics; appreciated  Medical management per medicine; appreciated   WBAT with assistance and surgical shoe to the right foot   Recommend Z-flex boot to offload the heels BL  Plan for R 2nd toe amputation addon today  NPO   Podiatry will continue to follow while in house 83 y/o F with a PMhx of DM, dementia, CABG, s/p 9/14 IMN left femur is seen and evaluated for:    1. Osteomyelitis, 2nd digit, right foot   2. Full thickness ulcer 2nd PIPJ, right foot  3. Pain 2nd digit, right foot   4. Superficial ulcer, left heel   5. PVD, LE B/L  6. DM   7. HAV B/L  8. 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 SARA Pitts  Vascular clearance provided by Dr. Puri; appreciated  Medical clearance provided by Dr. Fung; appreciated   X-ray of the right foot reviewed and discussed (on 11/14/17) with Dr. Puri, the aid and the patient on the treatment plan including but no limited to taking the patient to OR for the right 2nd infected toe amputation, risks, benefits and the healing course  Cleansed the R 2nd toe and Left heel ulcers with normal saline   Applied Bactroban to the L heel follow by Allevyn border.   Dressed R 2nd toe with gauze and Amol  Cardiology clearance: to be obtained; appreciated   Recommend continue IV antibiotics; appreciated  Medical management per medicine; appreciated   WBAT with assistance and surgical shoe to the right foot   Recommend Z-flex boot to offload the heels BL  Plan for R 2nd toe amputation addon today  NPO   Podiatry will continue to follow while in house

## 2017-11-15 NOTE — BRIEF OPERATIVE NOTE - PRE-OP DX
Osteomyelitis of toe of right foot  11/15/2017  Osteomyelitis of the 2nd toe with full thickness ulceration, right foot  Active  Yaritza Pinon

## 2017-11-15 NOTE — BRIEF OPERATIVE NOTE - OPERATION/FINDINGS
weak proximal and middle phalanx and non-viable tissue around the MPJ, and PIPJ, 2nd right toe weak proximal and middle phalanx and non-viable tissue around the MPJ, and PIPJ, 2nd right toe.  Base of proximal phalanx is hard with no clinical signs of OM

## 2017-11-15 NOTE — CONSULT NOTE ADULT - CONSULT REASON
Right 2nd toe infection
Vascular clearance for toe amputation
Asked to see patient for pre-op cardiac examination

## 2017-11-15 NOTE — CONSULT NOTE ADULT - PROBLEM SELECTOR RECOMMENDATION 9
Patient has several clinical predictors of perioperative risk but appears optimized for planned surgical procedure (toe debridement / amputation is generally associated with low cardiac risk and her functional status is good); continue perioperative beta blockade with metoprolol.

## 2017-11-16 VITALS
RESPIRATION RATE: 18 BRPM | DIASTOLIC BLOOD PRESSURE: 51 MMHG | TEMPERATURE: 98 F | HEART RATE: 81 BPM | SYSTOLIC BLOOD PRESSURE: 118 MMHG | OXYGEN SATURATION: 95 %

## 2017-11-16 LAB
GLUCOSE BLDC GLUCOMTR-MCNC: 127 MG/DL — HIGH (ref 70–99)
GLUCOSE BLDC GLUCOMTR-MCNC: 220 MG/DL — HIGH (ref 70–99)
GLUCOSE BLDC GLUCOMTR-MCNC: 228 MG/DL — HIGH (ref 70–99)

## 2017-11-16 PROCEDURE — 73630 X-RAY EXAM OF FOOT: CPT | Mod: 26,LT

## 2017-11-16 RX ORDER — NYSTATIN CREAM 100000 [USP'U]/G
1 CREAM TOPICAL
Qty: 15 | Refills: 0 | OUTPATIENT
Start: 2017-11-16

## 2017-11-16 RX ORDER — TRAMADOL HYDROCHLORIDE 50 MG/1
1 TABLET ORAL
Qty: 30 | Refills: 0 | OUTPATIENT
Start: 2017-11-16

## 2017-11-16 RX ORDER — NYSTATIN CREAM 100000 [USP'U]/G
1 CREAM TOPICAL
Qty: 0 | Refills: 0 | Status: DISCONTINUED | OUTPATIENT
Start: 2017-11-16 | End: 2017-11-16

## 2017-11-16 RX ORDER — ASPIRIN/ACETAMINOPHEN/CAFFEINE 250-250-65
1 TABLET ORAL
Qty: 0 | Refills: 0 | COMMUNITY

## 2017-11-16 RX ADMIN — VALSARTAN 80 MILLIGRAM(S): 80 TABLET ORAL at 08:37

## 2017-11-16 RX ADMIN — CEFEPIME 100 MILLIGRAM(S): 1 INJECTION, POWDER, FOR SOLUTION INTRAMUSCULAR; INTRAVENOUS at 08:23

## 2017-11-16 RX ADMIN — SODIUM CHLORIDE 75 MILLILITER(S): 9 INJECTION INTRAMUSCULAR; INTRAVENOUS; SUBCUTANEOUS at 11:53

## 2017-11-16 RX ADMIN — AMLODIPINE BESYLATE 5 MILLIGRAM(S): 2.5 TABLET ORAL at 08:20

## 2017-11-16 RX ADMIN — Medication 50 MILLIGRAM(S): at 08:24

## 2017-11-16 RX ADMIN — Medication 100 MILLIGRAM(S): at 11:55

## 2017-11-16 RX ADMIN — Medication 4: at 11:54

## 2017-11-16 RX ADMIN — Medication 12.5 MILLIGRAM(S): at 08:24

## 2017-11-16 RX ADMIN — MUPIROCIN 1 APPLICATION(S): 20 OINTMENT TOPICAL at 11:48

## 2017-11-16 RX ADMIN — Medication 1 TABLET(S): at 11:59

## 2017-11-16 NOTE — PROGRESS NOTE ADULT - ASSESSMENT
81 y/o F with a PMhx of DM, dementia, CABG, s/p 9/14 IMN left femur is seen and evaluated for:    1. Post amputation (11/15/2017) of Osteomyelitis, 2nd digit, right foot  2. Full thickness ulcer 2nd PIPJ, right foot  3. Pain 2nd digit, right foot   4. Superficial ulcer, left heel   5. PVD, LE B/L  6. DM   7. HAV B/L  8. 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 SARA Pitts  Discussed with the daughter in law the surgery outcome as well as the out long term care plan including but not limited antibiotics, local wound care  Discussed with the daughter to keep dressing dry, clean and intact until visiting Dr. SARA Pitts office    Rest, ice, and elevate the right foot   Partial heel weight bearing to the right foot with surgical shoe and assistance   Recommend continue IV antibiotics; switch to PO Doxycycline BID for 10 days upon discharge  Minimal ambulation for necessity such as going to bathroom; no PT at this time   Pain control   Cleansed the Left heel ulcers with normal saline   Applied Bactroban to the L heel follow by Allevyn border  Continue Z-flex boot to offload the heels B/L  Medical management per medicine; appreciated   dvt ppx  Patient is stable to discharge from podiatry standpoint   Podiatry will continue to follow up while in house

## 2017-11-16 NOTE — DISCHARGE NOTE ADULT - PROVIDER TOKENS
FREE:[LAST:[Podiatry],PHONE:[(   )    -],FAX:[(   )    -]],FREE:[LAST:[Primary care MD],PHONE:[(   )    -],FAX:[(   )    -]]

## 2017-11-16 NOTE — DISCHARGE NOTE ADULT - MEDICATION SUMMARY - MEDICATIONS TO TAKE
I will START or STAY ON the medications listed below when I get home from the hospital:    aspirin 81 mg oral tablet  -- 1 tab(s) by mouth once a day (in the morning)  -- Indication: For Htn    LORazepam 0.5 mg oral tablet  -- 1 tab(s) by mouth once a day (at bedtime)  -- Indication: For Insomnia    Januvia 100 mg oral tablet  -- 1 tab(s) by mouth once a day (at bedtime)  -- Indication: For Diabetes    metFORMIN 500 mg oral tablet  -- 2 tabs by mouth in the morning and 1 tab by mouth in the evening  -- Indication: For Diabetes    glimepiride 2 mg oral tablet  -- 1 tab(s) by mouth once a day (in the morning)  -- Indication: For Diabetes    atorvastatin 20 mg oral tablet  -- 1 tab(s) by mouth once a day (in the morning)  -- Indication: For Diabetes    Zetia 10 mg oral tablet  -- 1 tab(s) by mouth once a day (in the morning)  -- Indication: For Diabetes    valsartan-hydrochlorothiazide 80mg-12.5mg oral tablet  -- 1 tab(s) by mouth once a day (in the morning)  -- Indication: For htn    doxycycline monohydrate 100 mg oral capsule  -- 1 cap(s) by mouth every 12 hours  -- Indication: For Osteomyelitis    QUEtiapine 25 mg oral tablet  -- 1 tab(s) by mouth once a day (at bedtime)  -- Indication: For Dementia    metoprolol succinate 50 mg oral tablet, extended release  -- 1 tab(s) by mouth once a day (in the morning)  -- Indication: For Htn    amLODIPine 5 mg oral tablet  -- 1 tab(s) by mouth once a day (in the morning)  -- Indication: For Htn    nystatin 100,000 units/g topical powder  -- 1 application on skin 2 times a day;apply to rash present in the inframammary area  -- Indication: For rash    mometasone 0.1% topical cream  -- Apply on skin to affected area once a day (in the morning) to both hands and face  -- Indication: For rash    mupirocin 2% topical cream  -- Apply on skin to affected area once a day (in the morning) to ulcer on bottom left foot  -- Indication: For heel blister    Colace 100 mg oral capsule  -- 1 cap(s) by mouth once a day (in the morning)  -- Indication: For Constipation    Multiple Vitamins oral tablet  -- 1 tab(s) by mouth once a day (in the morning)  -- Indication: For supplement I will START or STAY ON the medications listed below when I get home from the hospital:    aspirin 81 mg oral tablet  -- 1 tab(s) by mouth once a day (in the morning)  -- Indication: For Htn    traMADol 50 mg oral tablet  -- 1 tab(s) by mouth 3 times a day MDD:prn pain, MDD 3tb  -- Caution federal law prohibits the transfer of this drug to any person other  than the person for whom it was prescribed.  May cause drowsiness.  Alcohol may intensify this effect.  Use care when operating dangerous machinery.  Obtain medical advice before taking any non-prescription drugs as some may affect the action of this medication.    -- Indication: For Pain    LORazepam 0.5 mg oral tablet  -- 1 tab(s) by mouth once a day (at bedtime)  -- Indication: For Insomnia    Januvia 100 mg oral tablet  -- 1 tab(s) by mouth once a day (at bedtime)  -- Indication: For Diabetes    metFORMIN 500 mg oral tablet  -- 2 tabs by mouth in the morning and 1 tab by mouth in the evening  -- Indication: For Diabetes    glimepiride 2 mg oral tablet  -- 1 tab(s) by mouth once a day (in the morning)  -- Indication: For Diabetes    atorvastatin 20 mg oral tablet  -- 1 tab(s) by mouth once a day (in the morning)  -- Indication: For Diabetes    Zetia 10 mg oral tablet  -- 1 tab(s) by mouth once a day (in the morning)  -- Indication: For Diabetes    valsartan-hydrochlorothiazide 80mg-12.5mg oral tablet  -- 1 tab(s) by mouth once a day (in the morning)  -- Indication: For htn    doxycycline monohydrate 100 mg oral capsule  -- 1 cap(s) by mouth every 12 hours  -- Indication: For Osteomyelitis    QUEtiapine 25 mg oral tablet  -- 1 tab(s) by mouth once a day (at bedtime)  -- Indication: For Dementia    metoprolol succinate 50 mg oral tablet, extended release  -- 1 tab(s) by mouth once a day (in the morning)  -- Indication: For Htn    amLODIPine 5 mg oral tablet  -- 1 tab(s) by mouth once a day (in the morning)  -- Indication: For Htn    nystatin 100,000 units/g topical powder  -- 1 application on skin 2 times a day;apply to rash present in the inframammary area  -- Indication: For rash    mometasone 0.1% topical cream  -- Apply on skin to affected area once a day (in the morning) to both hands and face  -- Indication: For rash    mupirocin 2% topical cream  -- Apply on skin to affected area once a day (in the morning) to ulcer on bottom left foot  -- Indication: For heel blister    Colace 100 mg oral capsule  -- 1 cap(s) by mouth once a day (in the morning)  -- Indication: For Constipation    Multiple Vitamins oral tablet  -- 1 tab(s) by mouth once a day (in the morning)  -- Indication: For supplement

## 2017-11-16 NOTE — DISCHARGE NOTE ADULT - CARE PROVIDER_API CALL
Podiatry,   Phone: (   )    -  Fax: (   )    -    Primary care MD,   Phone: (   )    -  Fax: (   )    -

## 2017-11-16 NOTE — DISCHARGE NOTE ADULT - PATIENT PORTAL LINK FT
“You can access the [facility] Patient Portal, offered by [facility], by registering with the following website: [email or web address]”

## 2017-11-16 NOTE — DISCHARGE NOTE ADULT - CARE PLAN
Principal Discharge DX:	Osteomyelitis  Goal:	feel well  Instructions for follow-up, activity and diet:	activity as tolerated, low salt/fat diet  Secondary Diagnosis:	DM (diabetes mellitus)

## 2017-11-16 NOTE — PROGRESS NOTE ADULT - ATTENDING COMMENTS
Patient seen bedside resting comfortable.  Patient dressing to the surgical site is clean dry and intact.  Patient reports only minimal pain  Patient will be seen tomorrow am and evaluated for possible discharge.
Patient seen today with residents for f/u of right second digit amputation secondary to OM.  Patient is resting comfortable in bed with her son Esdras bedside.  Patient denies pain.  Left heel ulceration is stable and was redressed this am    Patient is podiatrically stable for discharge.  Dressing to the surgical site right foot is to stay clean dry and intact.  Left foot posterior/plantar heel wound is stable and dressing change preformed today.  At home the left heel is to be changed daily with saline flush, jame ag, and dsd.  Patient has dressing supplies at home already.  Patient will be discharged on po abx x 10 days.  Path pending on intra-op bone biopsy to r/o OM of the proximal phalanx.  Patient to be protective weightbearing to the right foot with surgical shoe.  Patient to have bathroom privileges only while in hospital and only essential weightbearing privileges at home.  Patient to elevate right foot above heart, and to ice 20-30 min bid-tid.  She is to follow up in the office next week Tuesday/Wednesday for dressing changes.  Sutures to remain intact for 2-3 weeks.  Plan was discussed with her son Esdras and daughter in law today who were in the room at the time of my visit.

## 2017-11-16 NOTE — DISCHARGE NOTE ADULT - MEDICATION SUMMARY - MEDICATIONS TO STOP TAKING
I will STOP taking the medications listed below when I get home from the hospital:    sulfamethoxazole-trimethoprim DS  -- 1 tab(s) by mouth 2 times a day  **COURSE COMPLETED***

## 2017-11-16 NOTE — PROGRESS NOTE ADULT - SUBJECTIVE AND OBJECTIVE BOX
82 F with Hx of DM, CAD, HTN, HLD, Chronic L heel ulcer, Pancreatitis 208, AOCD, CABG, Uti was last admitted to  9/2017 with a mechanical fall where she sustained a left intertrochanteric fracture and underwent IM nailing by Dr. Stock, who presented to the ED with a non healing ulceration and drainage of her left 2nd toe  She has developed an ulcer over the right second toe for which she has been followed by Dr. Pitts from podiatry. Patient was treated unsuccessfully with oral Abx and was actually found to have OM    11.15: comfortable, no cp, no sob, no dyspnea            REVIEW OF SYSTEMS:    CONSTITUTIONAL: No weakness, No fevers or chills  ENT: No ear ache, No sorethroat  NECK: No pain, No stiffness  RESPIRATORY: No cough, No wheezing, No hemoptysis; No dyspnea  CARDIOVASCULAR: No chest pain, No palpitations  GASTROINTESTINAL: No abd pain, No nausea, No vomiting, No hematemesis, No diarrhea or constipation. No melena, No hematochezia.  GENITOURINARY: No dysuria, No  hematuria  NEUROLOGICAL: No diplopia, No paresthesia, No motor dysfunction  MUSCULOSKELETAL: No arthralgia, No myalgia  SKIN: No rashes, or lesions   PSYCH: no anxiety, no suicidal ideation    All other review of systems is negative unless indicated above    Vital Signs Last 24 Hrs  T(C): 36.6 (15 Nov 2017 11:16), Max: 36.8 (15 Nov 2017 05:09)  T(F): 97.8 (15 Nov 2017 11:16), Max: 98.2 (15 Nov 2017 05:09)  HR: 67 (15 Nov 2017 11:16) (66 - 67)  BP: 130/41 (15 Nov 2017 11:16) (130/41 - 140/68)  RR: 18 (15 Nov 2017 11:16) (17 - 18)  SpO2: 99% (15 Nov 2017 11:16) (99% - 100%)    PHYSICAL EXAM:    GENERAL: NAD, Well nourished  HEENT:  NC/AT, EOMI, PERRLA, No scleral icterus, Moist mucous membranes  NECK: Supple, No JVD  CNS:  Alert & Oriented X3, Motor Strength 5/5 B/L upper and lower extremities; DTRs 2+ intact   LUNG: Normal Breath sounds, Clear to auscultation bilaterally, No rales, No rhonchi, No wheezing  HEART: RRR; No murmurs, No rubs  ABDOMEN: +BS, ST/ND/NT  GENITOURINARY: Voiding, Bladder not distended  EXTREMITIES: Left 2nd toe ulceration and purulent drainage  MUSCULOSKELTAL: Joints normal ROM, No TTP, No effusion  VAGINAL: deferred  RECTAL: deferred, not indicated  BREAST: deferred                          10.8   4.0   )-----------( 241      ( 15 Nov 2017 07:04 )             32.7     11-15    140  |  103  |  18  ----------------------------<  126<H>  3.9   |  30  |  0.62    Ca    8.8      15 Nov 2017 07:04    TPro  7.4  /  Alb  3.4  /  TBili  0.4  /  DBili  x   /  AST  19  /  ALT  23  /  AlkPhos  125<H>  11-14    Vancomycin levels:   Cultures:     MEDICATIONS  (STANDING):  amLODIPine   Tablet 5 milliGRAM(s) Oral daily  atorvastatin 20 milliGRAM(s) Oral at bedtime  cefepime  IVPB 1000 milliGRAM(s) IV Intermittent every 12 hours  docusate sodium 100 milliGRAM(s) Oral daily  enoxaparin Injectable 40 milliGRAM(s) SubCutaneous every 24 hours  fluocinonide 0.05% Cream 1 Application(s) Topical daily  hydrochlorothiazide 12.5 milliGRAM(s) Oral daily  insulin lispro (HumaLOG) corrective regimen sliding scale   SubCutaneous three times a day before meals  insulin lispro (HumaLOG) corrective regimen sliding scale   SubCutaneous at bedtime  LORazepam     Tablet 0.5 milliGRAM(s) Oral at bedtime  metoprolol succinate ER 50 milliGRAM(s) Oral daily  multivitamin 1 Tablet(s) Oral daily  mupirocin 2% Ointment 1 Application(s) Topical daily  QUEtiapine 25 milliGRAM(s) Oral at bedtime  sodium chloride 0.45%. 1000 milliLiter(s) (75 mL/Hr) IV Continuous <Continuous>  valsartan 80 milliGRAM(s) Oral daily      all labs reviewed  all imaging reviewed    Assessment and Plan:    1. Left 2nd toe OM:  on IV Cefepime  for amputation and excisional debridement     Patient is medically stable for the above  low risk procedure    2. CAD s/p CABG:  cw bblockers/statins; restart antiplatelets postop  Ekg: sinus arrhythmia, no ischemic changes    3. Htn: stable  cw ARB/BBlockers  d/c Hctz perioperatively     4. Dementia: stable  Seroquel 25mg qHS
[PRE-OP NOTE]    Surgeon:  Gavin Pitts DPM  Date of Procedure: 11/15/2017  Pre-op diagnosis: Right foot 2nd digit osteomyelitis  Procedure: Removal of all nonviable soft tissue, bone, including 2nd digit amputation, right foot  HPI:  83 y/o F with a PMhx of DM, dementia, CABG, s/p 9/14 IMN left femur is seen and evaluated for chronic ulcer with 2nd digit osteomyelitis, right foot. The pt has been having the right 2nd toe ulcer over a year and has had infection multiple times, which treated with PO antibiotics. The patient is a poor historian and her son, Esdras is the health care proxy. The family agreed for the amputation of the right 2nd digit and removal of all nonviable tissue, and bones to prevent spreading of the infection.        PMH:  HTN  Type 2 Diabetes Mellitus  Hx of CAD with MI about 13 years ago, s/p CABG  Dementia  Anemia of Chronic Disease  Hyperlipidemia  Hx of Pancreatitis 2008  Hx of MDR UTI  Chronic Left Heel Ulcer  Chronic Right 2nd Toe ulcer  s/p Mechanical Fall 9/2017    No Hx of CVA, CKD, PAULINO, Asthma, COPD, DVT/PE, CHF, Arrythmias  PSH:   s/p 3V CABG about 13 years ago  s/p Left Hip IM nailing 9/2017    Vital Signs Last 24 Hrs  T(C): 36.8 (15 Nov 2017 05:09), Max: 36.8 (15 Nov 2017 05:09)  T(F): 98.2 (15 Nov 2017 05:09), Max: 98.2 (15 Nov 2017 05:09)  HR: 67 (15 Nov 2017 05:09) (64 - 67)  BP: 140/68 (15 Nov 2017 05:09) (140/61 - 159/46)  BP(mean): 78 (14 Nov 2017 16:34) (74 - 78)  RR: 18 (15 Nov 2017 05:09) (17 - 18)  SpO2: 100% (15 Nov 2017 05:09) (100% - 100%)    PHYSICAL EXAM:  Constitutional: NAD, awake and alert  LE Exam:   Derm:   Right foot: Dressing is dry, clean and intact. Upon removal of the dressing, full thickness ulcer noted at the PIPJ with granulating wound base, probe deep, no-malodor, no purulence, no active drainage ( negative serous, negative sanguinis), no fluctuance; periwound: (+) erythema, (+) edema. The remaining of the foot has no open lesion, no clinical signs of infection. No interdigital maceration, no fissuring. Nails are dystrophic and discolor x5.   Left foot: Dressing is dry, clean and intact. Upon removal of the dressing, superficial ulcer noted at the posterior aspect of the heel; no purulence, no active drainage ( negative serous, negative sanguinis), no fluctuance, no malodor, no probe to bone, mild erythema, and edema. No interdigital maceration, or fissuring. Nails are dystrophic and discolor x5.   Vasc:  Pedal pulses DP is palpable 1/4 B/L and PT is non-palpable B/L. CFT immediate in all toes x 10. Pedal hair absent. Varicosity noted at the dorsal aspect of the foot b/l. +1 pitting edema at the LLE.   Doppler ultrasound: strong regular biphasic at DP b/l and regular monophasic at PT b/l.   Neuro:   light touch sensation grossly intact B/L  Ortho:  Tenderness upon palpation to the ulcer at the right PIPJ of the 2nd toe. Reduced ROM of the right PIPJ of the 2nd toe. (-) blevins's and Ross's signs. LE compartments are soft and non-tender.   HAV B/L with Hallux underlaping the 2nd toe. Hammer 2nd toes B/L.       LABS: All Labs Reviewed:                RADIOLOGY:  Chest X-ray (11/14/17): Sternotomy wires and mediastinal surgical clips are noted. The lungs are clear. There is no pneumothorax.  X-ray right foot (11/14/17): the patient's area of concern is at the second proximal interphalangeal joint. No bony or soft tissue   abnormality is seen at this site. Given the lack of symptoms at the fifth metatarsophalangeal, this could represent an area of nonspecific bony demineralization.   EKG: Completed  Consent: To be signed   NPO since 10am this morning   Medical clearance provided by medical team; appreciated.  Pt and plan discussed with attending Dr. Gavin Pitts. Pt scheduled for podiatric surgical intervention on 11/15/17 for removal of all nonviable soft tissue, bone, including 2nd digit amputation, right foot. Plan discussed with patient and the patient's family. Benefits, risks, alternatives discussed with patient and the patient's family in layman’s terms. All questions answered. No guarantees made or implied about the outcome of the procedure. This is a limb salvage procedure. No contraindications to surgery at this time.
81 y/o F with a PMhx of DM, dementia, CABG, s/p 9/14 IMN left femur is seen and evaluated at bedside with daughter in law by her side for follow up s/p 11/15/2017 2nd right toe amputation with removal of nonviable soft tissue. Pt is resting comfortably in bed and in NAD. Pt reports no pain, numbness, tingling or weakness B/L LE including the surgical site. Pt denies any fever, chills, nausea, vomiting, SOB, or chest pain.     MEDICATIONS  (STANDING):  amLODIPine   Tablet 5 milliGRAM(s) Oral daily  atorvastatin 20 milliGRAM(s) Oral at bedtime  cefepime  IVPB 1000 milliGRAM(s) IV Intermittent every 12 hours  dextrose 5%. 1000 milliLiter(s) (50 mL/Hr) IV Continuous <Continuous>  dextrose 50% Injectable 12.5 Gram(s) IV Push once  dextrose 50% Injectable 25 Gram(s) IV Push once  dextrose 50% Injectable 25 Gram(s) IV Push once  docusate sodium 100 milliGRAM(s) Oral daily  enoxaparin Injectable 40 milliGRAM(s) SubCutaneous every 24 hours  fluocinonide 0.05% Cream 1 Application(s) Topical daily  hydrochlorothiazide 12.5 milliGRAM(s) Oral daily  insulin lispro (HumaLOG) corrective regimen sliding scale   SubCutaneous three times a day before meals  insulin lispro (HumaLOG) corrective regimen sliding scale   SubCutaneous at bedtime  LORazepam     Tablet 0.5 milliGRAM(s) Oral at bedtime  metoprolol succinate ER 50 milliGRAM(s) Oral daily  multivitamin 1 Tablet(s) Oral daily  mupirocin 2% Ointment 1 Application(s) Topical daily  QUEtiapine 25 milliGRAM(s) Oral at bedtime  sodium chloride 0.45%. 1000 milliLiter(s) (75 mL/Hr) IV Continuous <Continuous>  sodium chloride 0.9%. 1000 milliLiter(s) (75 mL/Hr) IV Continuous <Continuous>  valsartan 80 milliGRAM(s) Oral daily    MEDICATIONS  (PRN):  acetaminophen   Tablet 650 milliGRAM(s) Oral every 6 hours PRN For Temp greater than 38 C (100.4 F)  dextrose Gel 1 Dose(s) Oral once PRN Blood Glucose LESS THAN 70 milliGRAM(s)/deciliter  fentaNYL    Injectable 25 MICROGram(s) IV Push every 5 minutes PRN Moderate Pain  glucagon  Injectable 1 milliGRAM(s) IntraMuscular once PRN Glucose LESS THAN 70 milligrams/deciliter  ondansetron Injectable 4 milliGRAM(s) IV Push every 6 hours PRN Nausea and/or Vomiting      Allergies: NKDA    Vital Signs Last 24 Hrs  T(C): 36.9 (16 Nov 2017 05:14), Max: 37 (15 Nov 2017 20:22)  T(F): 98.4 (16 Nov 2017 05:14), Max: 98.6 (15 Nov 2017 20:22)  HR: 77 (16 Nov 2017 05:14) (67 - 84)  BP: 132/55 (16 Nov 2017 05:14) (130/41 - 151/51)  BP(mean): --  RR: 18 (16 Nov 2017 05:14) (14 - 18)  SpO2: 93% (16 Nov 2017 05:14) (93% - 99%)    PHYSICAL EXAM:  Constitutional: NAD, awake and alert  Extremities:   Derm:  Right foot: Z-flex offloading boot noted. Dressing dry, clean and intact, no strikethrough. Dressing left intact.   Left foot: Z-flex offloading boot noted. Superficial ulcer noted at the posterior aspect of the heel; no purulence, no active drainage ( negative serous, negative sanguinis), no fluctuance, no malodor, no probe to bone, mild erythema, and edema. No interdigital maceration, or fissuring. Nails are dystrophic and discolor x5.   Vasc:  Pedal pulses DP is palpable 1/4 on the left and PT is non-palpable on the Left. CFT <4sec in all toes x 9. Pedal hair absent. Varicosity noted at the dorsal aspect of the foot. +1 pitting edema at the LLE. Foot and toes x9 are warm to touch B/L.   Doppler ultrasound (11/14): strong regular biphasic at DP b/l and regular monophasic at PT b/l. TERRELL and PVE of the LE reviewed with Dr. Puri.   Neuro:   light touch sensation grossly intact B/L; patient demonstrated intact sensation at toes x4 at the RLE.   Ortho:   (-) blevins's and Ross's signs. LE compartments are soft and non-tender. Pt is able dorsiflex and plantarflex all toes x9 as well as the ankle without discomfort B/L.   HAV B/L with Hallux underlaping the 2nd toe. Hammer 2nd toes B/L.       LABS: All Labs Reviewed (no new labs)                     10.8   4.0   )-----------( 241      ( 15 Nov 2017 07:04 )             32.7     11-15    140  |  103  |  18  ----------------------------<  126<H>  3.9   |  30  |  0.62    Ca    8.8      15 Nov 2017 07:04    TPro  7.4  /  Alb  3.4  /  TBili  0.4  /  DBili  x   /  AST  19  /  ALT  23  /  AlkPhos  125<H>  11-14
81 y/o F with a PMhx of DM, dementia, CABG, s/p 9/14 IMN left femur is seen and evaluated on a wheel chair in her room for follow up chronic ulcer with osteomyelitis of the right 2nd toe. Pt is sitting comfortably in the wheelchair and in NAD. Pt states she has not eaten anything since last night. Pt denies any pain in the LE B/L. Pt denies any fever, chills, nausea, vomiting, SOB, or chest pain.       MEDICATIONS:  MEDICATIONS  (STANDING):  amLODIPine   Tablet 5 milliGRAM(s) Oral daily  atorvastatin 20 milliGRAM(s) Oral at bedtime  cefepime  IVPB 1000 milliGRAM(s) IV Intermittent every 12 hours  dextrose 5%. 1000 milliLiter(s) (50 mL/Hr) IV Continuous <Continuous>  dextrose 50% Injectable 12.5 Gram(s) IV Push once  dextrose 50% Injectable 25 Gram(s) IV Push once  dextrose 50% Injectable 25 Gram(s) IV Push once  docusate sodium 100 milliGRAM(s) Oral daily  enoxaparin Injectable 40 milliGRAM(s) SubCutaneous every 24 hours  fluocinonide 0.05% Cream 1 Application(s) Topical daily  hydrochlorothiazide 12.5 milliGRAM(s) Oral daily  insulin lispro (HumaLOG) corrective regimen sliding scale   SubCutaneous three times a day before meals  insulin lispro (HumaLOG) corrective regimen sliding scale   SubCutaneous at bedtime  LORazepam     Tablet 0.5 milliGRAM(s) Oral at bedtime  metoprolol succinate ER 50 milliGRAM(s) Oral daily  multivitamin 1 Tablet(s) Oral daily  mupirocin 2% Ointment 1 Application(s) Topical daily  QUEtiapine 25 milliGRAM(s) Oral at bedtime  sodium chloride 0.45%. 1000 milliLiter(s) (75 mL/Hr) IV Continuous <Continuous>  valsartan 80 milliGRAM(s) Oral daily      Allergies: NKDA    Vital Signs Last 24 Hrs  T(C): 36.8 (15 Nov 2017 05:09), Max: 36.8 (15 Nov 2017 05:09)  T(F): 98.2 (15 Nov 2017 05:09), Max: 98.2 (15 Nov 2017 05:09)  HR: 67 (15 Nov 2017 05:09) (64 - 67)  BP: 140/68 (15 Nov 2017 05:09) (140/61 - 159/46)  BP(mean): 78 (14 Nov 2017 16:34) (74 - 78)  RR: 18 (15 Nov 2017 05:09) (17 - 18)  SpO2: 100% (15 Nov 2017 05:09) (100% - 100%)      CAPILLARY BLOOD GLUCOSE      POCT Blood Glucose.: 92 mg/dL (15 Nov 2017 05:12)  POCT Blood Glucose.: 80 mg/dL (14 Nov 2017 20:38)      PHYSICAL EXAM:  Constitutional: NAD, awake and alert  Extremities:   Derm:  Right foot: Dressing is dry, clean and intact. Upon removal of the dressing, full thickness ulcer noted at the PIPJ with granulating wound base, probe deep, no-malodor, no purulence, no active drainage ( negative serous, negative sanguinis), no fluctuance; periwound: (+) erythema, (+) edema. The remaining of the foot has no open lesion, no clinical signs of infection. No interdigital maceration, no fissuring. Nails are dystrophic and discolor x5.   Left foot: Dressing is dry, clean and intact. Upon removal of the dressing, superficial ulcer noted at the posterior aspect of the heel; no purulence, no active drainage ( negative serous, negative sanguinis), no fluctuance, no malodor, no probe to bone, mild erythema, and edema. No interdigital maceration, or fissuring. Nails are dystrophic and discolor x5.   Vasc:   pedal pulses DP is palpable 1/4 B/L and PT is non-palpable B/L. CFT immediate in all toes x 10. Pedal hair absent. Varicosity noted at the dorsal aspect of the foot b/l. +1 pitting edema at the LLE.   Doppler ultrasound: strong regular biphasic at DP b/l and regular monophasic at PT b/l. TERRELL and PVE of the LE reviewed with Dr. Puri.   Neuro:   light touch sensation grossly intact B/L  Ortho:  Tenderness upon palpation to the ulcer at the right PIPJ of the 2nd toe. Reduced ROM of the right PIPJ of the 2nd toe. (-) blevins's and Ross's signs. LE compartments are soft and non-tender.   HAV B/L with Hallux underlaping the 2nd toe. Hammer 2nd toes B/L.       LABS: All Labs Reviewed:                        10.8   4.0   )-----------( 241      ( 15 Nov 2017 07:04 )             32.7     11-15    140  |  103  |  18  ----------------------------<  126<H>  3.9   |  30  |  0.62    Ca    8.8      15 Nov 2017 07:04    TPro  7.4  /  Alb  3.4  /  TBili  0.4  /  DBili  x   /  AST  19  /  ALT  23  /  AlkPhos  125<H>  11-14    PT/INR - ( 14 Nov 2017 10:54 )   PT: 10.8 sec;   INR: 1.00 ratio         PTT - ( 14 Nov 2017 10:54 )  PTT:25.6 sec
HPI: 81 y/o F with a PMhx of DM, dementia, CABG, s/p 9/14 IMN left femur is seen and evaluated at the ED for chronic ulcer right 2nd toe. Pt is a poor historian. The pt aid give HPI during the evaluation. The aid states the patient is sent by Dr. SARA Pitts for pre-op testing for right 2nd toe amputation tomorrow. The aid states the pt has been having the ulcer over a year and the toe was infected and the pt received oral antibiotics two weeks ago for 3 to 4 days. The aid states the toe got less swollen during the antibiotics treatment and then it is worsening after the antibiotics. The aid states there was mixed blood and cloudy drainage two weeks ago. The patient has been seen by Dr. SARA Pitts regularly. The aid denies the patient has fever, chills, nausea, vomiting, SOB, chest pain. The patient has outpatient biopsy done which shows osteomyelitis of the right 2nd toe.     REVIEW OF SYSTEMS: all other review of systems are negative except as in HPI    Allergies: No Known Allergies    MEDICATIONS  (STANDING):  sodium chloride 0.9% Bolus 1000 milliLiter(s) IV Bolus once  vancomycin  IVPB 1000 milliGRAM(s) IV Intermittent once      PAST MEDICAL & SURGICAL HISTORY:  Advanced Dementia  DM Type II  Hx of MDR UTI (hospitalized in 2016)  Anemia of Chronic Disease - longstanding  HTN / HLD  S/p CABG (13 years ago)  IMN left femur (9/14/17)    SOCIAL HISTORY: no EtOH, No smoking      FAMILY HISTORY: HTN, CAD      VITALS:  Vital Signs Last 24 Hrs  T(C): 36.6 (11-14-17 @ 10:39), Max: 36.6 (11-14-17 @ 10:39)  T(F): 97.8 (11-14-17 @ 10:39), Max: 97.8 (11-14-17 @ 10:39)  HR: 65 (11-14-17 @ 10:39) (65 - 65)  BP: 155/54 (11-14-17 @ 10:39) (155/54 - 155/54)  BP(mean): --  RR: 17 (11-14-17 @ 10:39) (17 - 17)  SpO2: 100% (11-14-17 @ 10:39) (100% - 100%)        PHYSICAL EXAM:  Constitutional: NAD, comfortable  Lower extremity exam:  Derm:   Left foot: Dressing is dry, clean and intact. Upon removal of the dressing, full thickness ulcer noted at the PIPJ with granulating wound base, probe deep, no-malodor, no purulence, no active drainage ( negative serous, negative sanguinis), no fluctuance; periwound: (+) erythema, (+) edema. The remaining of the foot has no open lesion, no clinical signs of infection. No interdigital maceration, no fissuring. Nails are dystrophic and discolor x5.   Right foot: Dressing is dry, clean and intact. Upon removal of the dressing, superficial ulcer noted at the posterior aspect of the heel; no purulence, no active drainage ( negative serous, negative sanguinis), no fluctuance, no malodor, no probe to bone, mild erythema, and edema. No interdigital maceration, or fissuring. Nails are dystrophic and discolor x5.   Vasc:  Pedal pulses DP is palpable 1/4 B/L and PT is non-palpable B/L. CFT immediate in all toes x 10. Pedal hair absent. Varicosity noted at the dorsal aspect of the foot b/l. +1 pitting edema at the LLE.   Doppler ultrasound: strong regular biphasic at DP b/l and regular monophasic at PT b/l.   Neuro:   light touch sensation grossly intact B/L  Ortho:  Tenderness upon palpation to the ulcer at the right PIPJ of the 2nd toe. Reduced ROM of the right PIPJ of the 2nd toe. (-) blevins's and Ross's signs. LE compartments are soft and non-tender.   HAV B/L with Hallux underlaping the 2nd toe. Hammer 2nd toes B/L.     LABS:                          11.3   6.0   )-----------( 272      ( 14 Nov 2017 10:54 )             35.0       11-14    135  |  101  |  24<H>  ----------------------------<  236<H>  4.5   |  28  |  0.83    Ca    9.0      14 Nov 2017 10:54    TPro  7.4  /  Alb  3.4  /  TBili  0.4  /  DBili  x   /  AST  19  /  ALT  23  /  AlkPhos  125<H>  11-14      PT/INR - ( 14 Nov 2017 10:54 )   PT: 10.8 sec;   INR: 1.00 ratio         PTT - ( 14 Nov 2017 10:54 )  PTT:25.6 sec      RADIOLOGY:  Chest X-ray (11/14): pending  Left foot 3 views Xray (11/14): official report pending.   Initial podiatry reading: soft tissue swelling and increase in soft tissue density at the R 2nd toe. Diffused Osteopenia. R 2nd toe cortex appear intact; however the reading is limited due to toes overlap. Vascular calcification.
Post op check    Pt is seen and evaluated for s/p removal of all nonviable soft tissue, bones, including 2nd digit amputation, right foot. Pt is resting comfortably in bed, in NAD. Pt states she feels a bit tired and has mild pain at the surgical site, which is managed with pain medication. Pt denies numbness, tingling, weakness or discomfort  in the LE b/l. Pt denies any SOB, chest pain, fever, chills, nausea, vomiting.     Vital signs: Stable     Physical exam:     Gen: NAD, AAOx3.     Right foot focused: Dressing dry, clean and intact, no strikethrough. Dressing left intact. CFT <4sec x 4. Pt is able dorsiflex and plantarflex all toes x4. Toes are warm to touch. +EHL/FHL. LE compartments are soft and nontender.        Assessment and Plan:     81 y/o F with a PMhx of DM, dementia, CABG, s/p 9/14 IMN left femur is seen and evaluated for:    1. Post amputation (11/15/2017) of Osteomyelitis, 2nd digit, right foot   2. Pain 2nd digit, right foot   3. Superficial ulcer, left heel   4. PVD, LE B/L  5. DM   6. HAV B/L  7. 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 SARA Pitts  Discussed with the patient and family the surgery outcome as well as the out long term care plan including but not limited antibiotics, local wound care, and PT  Pt instructed to keep dressing dry, clean and intact   Rest, ice, and elevate the right foot   Partial heel weight bearing to the right foot with surgical shoe and assistance   Recommend continue IV antibiotics; appreciated  Pain control   Medical management per medicine; appreciated   dvt ppx  Podiatry will continue to follow up while in house

## 2017-11-17 DIAGNOSIS — M86.671 OTHER CHRONIC OSTEOMYELITIS, RIGHT ANKLE AND FOOT: ICD-10-CM

## 2017-11-17 DIAGNOSIS — I25.10 ATHEROSCLEROTIC HEART DISEASE OF NATIVE CORONARY ARTERY WITHOUT ANGINA PECTORIS: ICD-10-CM

## 2017-11-17 DIAGNOSIS — E11.621 TYPE 2 DIABETES MELLITUS WITH FOOT ULCER: ICD-10-CM

## 2017-11-17 DIAGNOSIS — Z95.1 PRESENCE OF AORTOCORONARY BYPASS GRAFT: ICD-10-CM

## 2017-11-17 DIAGNOSIS — E11.69 TYPE 2 DIABETES MELLITUS WITH OTHER SPECIFIED COMPLICATION: ICD-10-CM

## 2017-11-17 DIAGNOSIS — L97.519 NON-PRESSURE CHRONIC ULCER OF OTHER PART OF RIGHT FOOT WITH UNSPECIFIED SEVERITY: ICD-10-CM

## 2017-11-17 DIAGNOSIS — L89.629 PRESSURE ULCER OF LEFT HEEL, UNSPECIFIED STAGE: ICD-10-CM

## 2017-11-17 DIAGNOSIS — M86.171 OTHER ACUTE OSTEOMYELITIS, RIGHT ANKLE AND FOOT: ICD-10-CM

## 2017-11-17 DIAGNOSIS — E78.5 HYPERLIPIDEMIA, UNSPECIFIED: ICD-10-CM

## 2017-11-17 DIAGNOSIS — Z79.4 LONG TERM (CURRENT) USE OF INSULIN: ICD-10-CM

## 2017-11-22 LAB — SURGICAL PATHOLOGY FINAL REPORT - CH: SIGNIFICANT CHANGE UP

## 2017-11-23 LAB
-  AMIKACIN: SIGNIFICANT CHANGE UP
-  AZTREONAM: SIGNIFICANT CHANGE UP
-  CEFEPIME: SIGNIFICANT CHANGE UP
-  CEFTAZIDIME: SIGNIFICANT CHANGE UP
-  CIPROFLOXACIN: SIGNIFICANT CHANGE UP
-  GENTAMICIN: SIGNIFICANT CHANGE UP
-  IMIPENEM: SIGNIFICANT CHANGE UP
-  LEVOFLOXACIN: SIGNIFICANT CHANGE UP
-  MEROPENEM: SIGNIFICANT CHANGE UP
-  PIPERACILLIN/TAZOBACTAM: SIGNIFICANT CHANGE UP
-  TOBRAMYCIN: SIGNIFICANT CHANGE UP
CULTURE RESULTS: SIGNIFICANT CHANGE UP
METHOD TYPE: SIGNIFICANT CHANGE UP
ORGANISM # SPEC MICROSCOPIC CNT: SIGNIFICANT CHANGE UP
ORGANISM # SPEC MICROSCOPIC CNT: SIGNIFICANT CHANGE UP
SPECIMEN SOURCE: SIGNIFICANT CHANGE UP

## 2017-12-16 LAB
CULTURE RESULTS: SIGNIFICANT CHANGE UP
SPECIMEN SOURCE: SIGNIFICANT CHANGE UP

## 2019-04-27 ENCOUNTER — TRANSCRIPTION ENCOUNTER (OUTPATIENT)
Age: 84
End: 2019-04-27

## 2019-04-27 ENCOUNTER — INPATIENT (INPATIENT)
Facility: HOSPITAL | Age: 84
LOS: 0 days | Discharge: HOSPICE | End: 2019-04-27
Attending: NEUROLOGICAL SURGERY | Admitting: NEUROLOGICAL SURGERY
Payer: MEDICARE

## 2019-04-27 VITALS
SYSTOLIC BLOOD PRESSURE: 157 MMHG | RESPIRATION RATE: 24 BRPM | DIASTOLIC BLOOD PRESSURE: 73 MMHG | OXYGEN SATURATION: 95 % | HEART RATE: 75 BPM

## 2019-04-27 VITALS
DIASTOLIC BLOOD PRESSURE: 72 MMHG | OXYGEN SATURATION: 93 % | HEART RATE: 79 BPM | HEIGHT: 61 IN | RESPIRATION RATE: 26 BRPM | SYSTOLIC BLOOD PRESSURE: 189 MMHG | WEIGHT: 134.92 LBS

## 2019-04-27 DIAGNOSIS — Z89.422 ACQUIRED ABSENCE OF OTHER LEFT TOE(S): Chronic | ICD-10-CM

## 2019-04-27 DIAGNOSIS — Z89.421 ACQUIRED ABSENCE OF OTHER RIGHT TOE(S): Chronic | ICD-10-CM

## 2019-04-27 DIAGNOSIS — I10 ESSENTIAL (PRIMARY) HYPERTENSION: ICD-10-CM

## 2019-04-27 DIAGNOSIS — G93.5 COMPRESSION OF BRAIN: ICD-10-CM

## 2019-04-27 DIAGNOSIS — G93.6 CEREBRAL EDEMA: ICD-10-CM

## 2019-04-27 DIAGNOSIS — Z95.1 PRESENCE OF AORTOCORONARY BYPASS GRAFT: Chronic | ICD-10-CM

## 2019-04-27 DIAGNOSIS — Z98.890 OTHER SPECIFIED POSTPROCEDURAL STATES: Chronic | ICD-10-CM

## 2019-04-27 DIAGNOSIS — E11.9 TYPE 2 DIABETES MELLITUS WITHOUT COMPLICATIONS: ICD-10-CM

## 2019-04-27 DIAGNOSIS — I25.10 ATHEROSCLEROTIC HEART DISEASE OF NATIVE CORONARY ARTERY WITHOUT ANGINA PECTORIS: ICD-10-CM

## 2019-04-27 DIAGNOSIS — I61.9 NONTRAUMATIC INTRACEREBRAL HEMORRHAGE, UNSPECIFIED: ICD-10-CM

## 2019-04-27 DIAGNOSIS — I61.0 NONTRAUMATIC INTRACEREBRAL HEMORRHAGE IN HEMISPHERE, SUBCORTICAL: ICD-10-CM

## 2019-04-27 PROBLEM — F03.90 UNSPECIFIED DEMENTIA WITHOUT BEHAVIORAL DISTURBANCE: Chronic | Status: ACTIVE | Noted: 2017-11-21

## 2019-04-27 LAB
ALBUMIN SERPL ELPH-MCNC: 3 G/DL — LOW (ref 3.3–5)
ALP SERPL-CCNC: 74 U/L — SIGNIFICANT CHANGE UP (ref 40–120)
ALT FLD-CCNC: 19 U/L — SIGNIFICANT CHANGE UP (ref 12–78)
ANION GAP SERPL CALC-SCNC: 5 MMOL/L — SIGNIFICANT CHANGE UP (ref 5–17)
APTT BLD: 22.7 SEC — LOW (ref 27.5–36.3)
AST SERPL-CCNC: 16 U/L — SIGNIFICANT CHANGE UP (ref 15–37)
BILIRUB SERPL-MCNC: 0.3 MG/DL — SIGNIFICANT CHANGE UP (ref 0.2–1.2)
BUN SERPL-MCNC: 29 MG/DL — HIGH (ref 7–23)
CALCIUM SERPL-MCNC: 8.5 MG/DL — SIGNIFICANT CHANGE UP (ref 8.5–10.1)
CHLORIDE SERPL-SCNC: 104 MMOL/L — SIGNIFICANT CHANGE UP (ref 96–108)
CO2 SERPL-SCNC: 29 MMOL/L — SIGNIFICANT CHANGE UP (ref 22–31)
CREAT SERPL-MCNC: 0.83 MG/DL — SIGNIFICANT CHANGE UP (ref 0.5–1.3)
GLUCOSE BLDC GLUCOMTR-MCNC: 295 MG/DL — HIGH (ref 70–99)
GLUCOSE SERPL-MCNC: 239 MG/DL — HIGH (ref 70–99)
HCT VFR BLD CALC: 32.5 % — LOW (ref 34.5–45)
HGB BLD-MCNC: 10.2 G/DL — LOW (ref 11.5–15.5)
INR BLD: 0.97 RATIO — SIGNIFICANT CHANGE UP (ref 0.88–1.16)
LACTATE SERPL-SCNC: 2.7 MMOL/L — HIGH (ref 0.7–2)
MCHC RBC-ENTMCNC: 27.2 PG — SIGNIFICANT CHANGE UP (ref 27–34)
MCHC RBC-ENTMCNC: 31.4 GM/DL — LOW (ref 32–36)
MCV RBC AUTO: 86.7 FL — SIGNIFICANT CHANGE UP (ref 80–100)
NRBC # BLD: 0 /100 WBCS — SIGNIFICANT CHANGE UP (ref 0–0)
PLATELET # BLD AUTO: 242 K/UL — SIGNIFICANT CHANGE UP (ref 150–400)
POTASSIUM SERPL-MCNC: 4.3 MMOL/L — SIGNIFICANT CHANGE UP (ref 3.5–5.3)
POTASSIUM SERPL-SCNC: 4.3 MMOL/L — SIGNIFICANT CHANGE UP (ref 3.5–5.3)
PROT SERPL-MCNC: 6.4 GM/DL — SIGNIFICANT CHANGE UP (ref 6–8.3)
PROTHROM AB SERPL-ACNC: 10.7 SEC — SIGNIFICANT CHANGE UP (ref 10–12.9)
RBC # BLD: 3.75 M/UL — LOW (ref 3.8–5.2)
RBC # FLD: 14.7 % — HIGH (ref 10.3–14.5)
SODIUM SERPL-SCNC: 138 MMOL/L — SIGNIFICANT CHANGE UP (ref 135–145)
TROPONIN I SERPL-MCNC: <0.015 NG/ML — SIGNIFICANT CHANGE UP (ref 0.01–0.04)
WBC # BLD: 14.41 K/UL — HIGH (ref 3.8–10.5)
WBC # FLD AUTO: 14.41 K/UL — HIGH (ref 3.8–10.5)

## 2019-04-27 PROCEDURE — 74176 CT ABD & PELVIS W/O CONTRAST: CPT | Mod: 26

## 2019-04-27 PROCEDURE — 93010 ELECTROCARDIOGRAM REPORT: CPT

## 2019-04-27 PROCEDURE — 71250 CT THORAX DX C-: CPT | Mod: 26

## 2019-04-27 PROCEDURE — 99222 1ST HOSP IP/OBS MODERATE 55: CPT

## 2019-04-27 PROCEDURE — 72125 CT NECK SPINE W/O DYE: CPT | Mod: 26

## 2019-04-27 PROCEDURE — 70450 CT HEAD/BRAIN W/O DYE: CPT | Mod: 26

## 2019-04-27 PROCEDURE — 99285 EMERGENCY DEPT VISIT HI MDM: CPT | Mod: GW

## 2019-04-27 RX ORDER — MOMETASONE FUROATE 1 MG/G
1 CREAM TOPICAL
Qty: 0 | Refills: 0 | COMMUNITY

## 2019-04-27 RX ORDER — GLIMEPIRIDE 1 MG
1 TABLET ORAL
Qty: 0 | Refills: 0 | COMMUNITY

## 2019-04-27 RX ORDER — DOCUSATE SODIUM 100 MG
1 CAPSULE ORAL
Qty: 0 | Refills: 0 | COMMUNITY

## 2019-04-27 RX ORDER — ASPIRIN/ACETAMINOPHEN/CAFFEINE 250-250-65
1 TABLET ORAL
Qty: 0 | Refills: 0 | COMMUNITY

## 2019-04-27 RX ORDER — DEXTROSE 50 % IN WATER 50 %
25 SYRINGE (ML) INTRAVENOUS ONCE
Qty: 0 | Refills: 0 | Status: DISCONTINUED | OUTPATIENT
Start: 2019-04-27 | End: 2019-04-27

## 2019-04-27 RX ORDER — MORPHINE SULFATE 50 MG/1
2 CAPSULE, EXTENDED RELEASE ORAL EVERY 6 HOURS
Qty: 0 | Refills: 0 | Status: DISCONTINUED | OUTPATIENT
Start: 2019-04-27 | End: 2019-04-27

## 2019-04-27 RX ORDER — ATORVASTATIN CALCIUM 80 MG/1
1 TABLET, FILM COATED ORAL
Qty: 0 | Refills: 0 | COMMUNITY

## 2019-04-27 RX ORDER — EZETIMIBE 10 MG/1
1 TABLET ORAL
Qty: 0 | Refills: 0 | COMMUNITY

## 2019-04-27 RX ORDER — ONDANSETRON 8 MG/1
4 TABLET, FILM COATED ORAL
Qty: 0 | Refills: 0 | COMMUNITY
Start: 2019-04-27

## 2019-04-27 RX ORDER — ASPIRIN/CALCIUM CARB/MAGNESIUM 324 MG
1 TABLET ORAL
Qty: 0 | Refills: 0 | COMMUNITY

## 2019-04-27 RX ORDER — GLUCAGON INJECTION, SOLUTION 0.5 MG/.1ML
1 INJECTION, SOLUTION SUBCUTANEOUS ONCE
Qty: 0 | Refills: 0 | Status: DISCONTINUED | OUTPATIENT
Start: 2019-04-27 | End: 2019-04-27

## 2019-04-27 RX ORDER — DEXTROSE 50 % IN WATER 50 %
15 SYRINGE (ML) INTRAVENOUS ONCE
Qty: 0 | Refills: 0 | Status: DISCONTINUED | OUTPATIENT
Start: 2019-04-27 | End: 2019-04-27

## 2019-04-27 RX ORDER — MORPHINE SULFATE 50 MG/1
2 CAPSULE, EXTENDED RELEASE ORAL ONCE
Qty: 0 | Refills: 0 | Status: DISCONTINUED | OUTPATIENT
Start: 2019-04-27 | End: 2019-04-27

## 2019-04-27 RX ORDER — MORPHINE SULFATE 50 MG/1
1 CAPSULE, EXTENDED RELEASE ORAL
Qty: 0 | Refills: 0 | COMMUNITY
Start: 2019-04-27

## 2019-04-27 RX ORDER — AMLODIPINE BESYLATE 2.5 MG/1
1 TABLET ORAL
Qty: 0 | Refills: 0 | COMMUNITY

## 2019-04-27 RX ORDER — ONDANSETRON 8 MG/1
4 TABLET, FILM COATED ORAL EVERY 6 HOURS
Qty: 0 | Refills: 0 | Status: DISCONTINUED | OUTPATIENT
Start: 2019-04-27 | End: 2019-04-27

## 2019-04-27 RX ORDER — METFORMIN HYDROCHLORIDE 850 MG/1
0 TABLET ORAL
Qty: 0 | Refills: 0 | COMMUNITY

## 2019-04-27 RX ORDER — METOPROLOL TARTRATE 50 MG
1 TABLET ORAL
Qty: 0 | Refills: 0 | COMMUNITY

## 2019-04-27 RX ORDER — DEXTROSE 50 % IN WATER 50 %
12.5 SYRINGE (ML) INTRAVENOUS ONCE
Qty: 0 | Refills: 0 | Status: DISCONTINUED | OUTPATIENT
Start: 2019-04-27 | End: 2019-04-27

## 2019-04-27 RX ORDER — MUPIROCIN 20 MG/G
1 OINTMENT TOPICAL
Qty: 0 | Refills: 0 | Status: DISCONTINUED | OUTPATIENT
Start: 2019-04-27 | End: 2019-04-27

## 2019-04-27 RX ORDER — SODIUM CHLORIDE 9 MG/ML
1000 INJECTION, SOLUTION INTRAVENOUS
Qty: 0 | Refills: 0 | Status: DISCONTINUED | OUTPATIENT
Start: 2019-04-27 | End: 2019-04-27

## 2019-04-27 RX ORDER — SITAGLIPTIN 50 MG/1
1 TABLET, FILM COATED ORAL
Qty: 0 | Refills: 0 | COMMUNITY

## 2019-04-27 RX ORDER — ONDANSETRON 8 MG/1
4 TABLET, FILM COATED ORAL ONCE
Qty: 0 | Refills: 0 | Status: COMPLETED | OUTPATIENT
Start: 2019-04-27 | End: 2019-04-27

## 2019-04-27 RX ORDER — INSULIN LISPRO 100/ML
VIAL (ML) SUBCUTANEOUS
Qty: 0 | Refills: 0 | Status: DISCONTINUED | OUTPATIENT
Start: 2019-04-27 | End: 2019-04-27

## 2019-04-27 RX ADMIN — ONDANSETRON 4 MILLIGRAM(S): 8 TABLET, FILM COATED ORAL at 07:45

## 2019-04-27 RX ADMIN — ONDANSETRON 4 MILLIGRAM(S): 8 TABLET, FILM COATED ORAL at 15:17

## 2019-04-27 RX ADMIN — MORPHINE SULFATE 2 MILLIGRAM(S): 50 CAPSULE, EXTENDED RELEASE ORAL at 09:26

## 2019-04-27 RX ADMIN — MORPHINE SULFATE 2 MILLIGRAM(S): 50 CAPSULE, EXTENDED RELEASE ORAL at 11:58

## 2019-04-27 RX ADMIN — MORPHINE SULFATE 2 MILLIGRAM(S): 50 CAPSULE, EXTENDED RELEASE ORAL at 12:54

## 2019-04-27 NOTE — DISCHARGE NOTE PROVIDER - CARE PROVIDER_API CALL
Troy Andrade; PhD)  Neurosurgery  [address] [facility], [address]nd [place] [address]  Phone: [phone]  Fax: [phone]  Follow Up Time:

## 2019-04-27 NOTE — DISCHARGE NOTE PROVIDER - HOSPITAL COURSE
Patient is an 84 year old woman with a history of dementia, DM, CAD, HTN and HLD; she presented as a trauma code to the ED at 7:40 with an abrasion above her right eye.    As per the son she had vomiting last night and hit her head on the hospital bed rail, she was found to be unresponsive this morning and EMS was called to bring her to the hospital.    CT of the head showed a large left parietal and occipital lobe ICH with surrounding cerebral edema and brain compression.    On exam pt is non-responsive to voice, she moves her left arm spontaneously and purposefully, RUE 0/5, and triple flexion to b/l lower ext. Pupils and small, 2mm, equal, and non-reactive, +corneals, +gag. Pt was given Zofran in the ED for vomiting. MOLST Reviewed, the son had already left to take care of another family member who is on home hospice. His wishes were very clear: patient is NDR/DNI, comfort measures only, no IV fluids, no NG tube, no feeding tubes, no IV ABX    The patient was admitted to the Neurosurgery service. Case management was consulted as the family requested hospice if appropriated.    While in the ED the patient remained unresponsive, morphine was given for comfort, there was no additional vomiting, family was at bedside.    [facility] and [facility] accepted the patient for hospice care.

## 2019-04-27 NOTE — ED PROVIDER NOTE - OBJECTIVE STATEMENT
83 yo female biba from home with ams. As per son pt vomitted last night and hit her head this morning she was unresponsive. pt has ho dm and severe dementia. she is a dnr/dni documented from 2016 and son at bedside concurs pt is not to be intubated or resuscitated.

## 2019-04-27 NOTE — PHARMACOTHERAPY INTERVENTION NOTE - COMMENTS
Completed medication history. Home medication list obtained from a list from family but they did not know if patient was on any other medications besides those on the list. Also confirmed with Dr Reardon  Could not reach other family members via phone (invalid phone numbers)

## 2019-04-27 NOTE — DISCHARGE NOTE NURSING/CASE MANAGEMENT/SOCIAL WORK - NSDCPEPTSTRK_GEN_ALL_CORE
Call 911 for stroke/Stroke warning signs and symptoms/Risk factors for stroke/Stroke support groups for patients, families, and friends/Signs and symptoms of stroke/Need for follow up after discharge/Prescribed medications/Stroke education booklet

## 2019-04-27 NOTE — ED PROVIDER NOTE - PROGRESS NOTE DETAILS
pt has large ich, son at bedside comfort care only, no intubation. papers signed. dr duran at bedside . son who is poa does not want any intervention. will call neurosurg for consult pt tba. neurosurg pa aware of pt B Romelia LOPEZ

## 2019-04-27 NOTE — DISCHARGE NOTE PROVIDER - NSDCCPCAREPLAN_GEN_ALL_CORE_FT
PRINCIPAL DISCHARGE DIAGNOSIS  Diagnosis: ICH (intracerebral hemorrhage)  Assessment and Plan of Treatment: Pt to be transfered to [facility]

## 2019-04-27 NOTE — DISCHARGE NOTE NURSING/CASE MANAGEMENT/SOCIAL WORK - NSDCDPATPORTLINK_GEN_ALL_CORE
You can access the [facility], offered by [facility], by registering with the following website: [email or web address]

## 2019-04-27 NOTE — ED ADULT TRIAGE NOTE - CHIEF COMPLAINT QUOTE
found unresponsive in living room, possible head injury, low 02 sat as per ems, straight to trauma upon arrival. code trauma initiated after known head injury.

## 2019-04-27 NOTE — CONSULT NOTE ADULT - SUBJECTIVE AND OBJECTIVE BOX
Pt seen and examined with Dr. Mantilla. 85 yo female biba from home with ams. As per son pt vomitted last night and hit her head this morning she was unresponsive. pt has ho dm and severe dementia. she is a dnr/dni documented Chart and son at bedside concurs pt is not to be intubated or resuscitated.      Vital Signs Last 24 Hrs  T(C): 35.5 (27 Apr 2019 08:10), Max: 35.5 (27 Apr 2019 08:10)  T(F): 95.9 (27 Apr 2019 08:10), Max: 95.9 (27 Apr 2019 08:10)  HR: 75 (27 Apr 2019 12:58) (69 - 79)  BP: 157/73 (27 Apr 2019 12:58) (125/53 - 189/72)  BP(mean): --  RR: 24 (27 Apr 2019 12:58) (6 - 26)  SpO2: 95% (27 Apr 2019 12:58) (71% - 100%)    LABS:                        10.2   14.41 )-----------( 242      ( 27 Apr 2019 07:38 )             32.5     04-27    138  |  104  |  29<H>  ----------------------------<  239<H>  4.3   |  29  |  0.83    Ca    8.5      27 Apr 2019 07:38    TPro  6.4  /  Alb  3.0<L>  /  TBili  0.3  /  DBili  x   /  AST  16  /  ALT  19  /  AlkPhos  74  04-27    PT/INR - ( 27 Apr 2019 08:38 )   PT: 10.7 sec;   INR: 0.97 ratio         PTT - ( 27 Apr 2019 08:38 )  PTT:22.7 sec      MEDICATIONS:dextrose 40% Gel 15 Gram(s) Oral once PRN  dextrose 5%. 1000 milliLiter(s) IV Continuous <Continuous>  dextrose 50% Injectable 12.5 Gram(s) IV Push once  dextrose 50% Injectable 25 Gram(s) IV Push once  dextrose 50% Injectable 25 Gram(s) IV Push once  glucagon  Injectable 1 milliGRAM(s) IntraMuscular once PRN  insulin lispro (HumaLOG) corrective regimen sliding scale   SubCutaneous three times a day before meals  morphine  - Injectable 2 milliGRAM(s) IV Push every 6 hours PRN  mupirocin 2% Ointment 1 Application(s) Topical two times a day  ondansetron Injectable 4 milliGRAM(s) IV Push every 6 hours PRN    Pain medications:   morphine  - Injectable 2 milliGRAM(s) IV Push every 6 hours PRN  ondansetron Injectable 4 milliGRAM(s) IV Push every 6 hours PRN      RADIOLOGY & ADDITIONAL STUDIES:  CT Head, C-Spine: ICH    CONSTITUTIONAL: unresponsive with vomiting  · ENMT: Airway patent, Nasal mucosa clear. Mouth with normal mucosa. blue ecchymosis to right forehead  · EYES: eyes shut, pupils fixed and pinpoint  · CARDIAC: Normal rate, regular rhythm.  Heart sounds S1, S2.  · RESPIRATORY: Breath sounds clear and equal bilaterally.  · GASTROINTESTINAL: Abdomen soft, nd  · MUSCULOSKELETAL: Spine appears normal,  · NEUROLOGICAL: unresponsive no corneal reflex  · SKIN: Skin normal color for pale, warm, dry and intact. No evidence of rash.  · PSYCHIATRIC: pt with baseline dementia and currently unresponsive  · HEME LYMPH: No adenopathy or splenomegaly. No cervical or inguinal lymphadenopathy.

## 2019-04-27 NOTE — ED ADULT NURSE NOTE - NSIMPLEMENTINTERV_GEN_ALL_ED
Implemented All Fall with Harm Risk Interventions:  Carrie to call system. Call bell, personal items and telephone within reach. Instruct patient to call for assistance. Room bathroom lighting operational. Non-slip footwear when patient is off stretcher. Physically safe environment: no spills, clutter or unnecessary equipment. Stretcher in lowest position, wheels locked, appropriate side rails in place. Provide visual cue, wrist band, yellow gown, etc. Monitor gait and stability. Monitor for mental status changes and reorient to person, place, and time. Review medications for side effects contributing to fall risk. Reinforce activity limits and safety measures with patient and family. Provide visual clues: red socks.

## 2019-04-27 NOTE — H&P ADULT - NSHPLABSRESULTS_GEN_ALL_CORE
< from: CT Head No Cont (04.27.19 @ 08:00) >  IMPRESSION:  8 cm hemorrhage within the LEFT parietal and occipital lobes   with surrounding edema, effacement of the LEFT lateral ventricle and 1.2   cm falcine herniation to the RIGHT and entrapment of the RIGHT ventricle.   There is LEFT-sided uncal herniation. Small LEFT tentorial subdural   hematoma is also noted.      < from: CT Cervical Spine No Cont (04.27.19 @ 08:00) >  IMPRESSION:   No vertebral fracture is recognized.  Multilevel   degenerative disc disease and spondylosis at C3-4 through C6-7 with   narrowing of the BILATERAL C3-4 through C6-7 neural foramina due to   uncovertebral spurring and facet osteophytic hypertrophy.         04-27    138  |  104  |  29<H>  ----------------------------<  239<H>  4.3   |  29  |  0.83                  10.2   14.41 )-----------( 242      ( 27 Apr 2019 07:38 )             32.5   PT/INR - ( 27 Apr 2019 08:38 )   PT: 10.7 sec;   INR: 0.97 ratio      PTT - ( 27 Apr 2019 08:38 )  PTT:22.7 sec

## 2019-04-27 NOTE — H&P ADULT - ASSESSMENT
Patient is an 84 year old woman with a history of Dementia, CAD s/p CABG, HTN, and DM who has a large left parietal and occipital hemorrhage with surrounding cerebral edema and brain compression.     Family wishes were very clear: patient is NDR/DNI, comfort measures only, no IV fluids, no NG tube, no feeding tubes, no IV ABX     PLAN:  No acute neurosurgical intervention  Comfort Measures ONLY  Palliative Care Consult  Admit to Med/Surg floor   No IV hydration   Morphine PRN for respiratory distress   Zofran PRN for vomiting     Patient has an estimated Caprini Score of greater than 5.  However, the patient's unique clinical situation will be addressed  in an individual manner to determine appropriate anticoagulation treatment, if any

## 2019-04-27 NOTE — H&P ADULT - NSICDXPASTMEDICALHX_GEN_ALL_CORE_FT
PAST MEDICAL HISTORY:  CAD (coronary artery disease)     Dementia     DM (diabetes mellitus)     History of anemia of chronic disease     History of MI (myocardial infarction)     History of pancreatitis     Hyperlipidemia     Hypertension

## 2019-04-27 NOTE — H&P ADULT - NSHPPHYSICALEXAM_GEN_ALL_CORE
Constitutional: obtunded, no response to voice, no acute distress   HEENT: pupils round, 2mm, no response to light   Neck: Supple  Respiratory: Breath sounds are clear bilaterally  Cardiovascular: S1 and S2, regular rhythm  Gastrointestinal: soft, nontender  Extremities:  no edema  Musculoskeletal: no joint swelling/tenderness, no abnormal movements  Skin: No rashes    Neurological exam:  HF: obtunded, no response to voice, no eye opening  CN: face symmetric, pupils round, +corneal, 2mm- no reaction to light,   Motor: LUE moves spontaneously and purposefully, RUE 0/5, b/l lower ext + triple flexion    Sens: no reaction to painful stimuli RUE, triple flexion b/l lower ext, withdrawal to pain LUE   Reflexes: toes upgoing on right, down going on left   Coord:  unable to assess   Gait/Balance: unable to assess Vital Signs Last 24 Hrs  T(C): 35.5 (27 Apr 2019 08:10), Max: 35.5 (27 Apr 2019 08:10)  T(F): 95.9 (27 Apr 2019 08:10), Max: 95.9 (27 Apr 2019 08:10)  HR: 79 (27 Apr 2019 07:40) (79 - 79)  BP: 189/72 (27 Apr 2019 07:40) (189/72 - 189/72)  RR: 26 (27 Apr 2019 07:40) (6 - 26)  SpO2: 93% (27 Apr 2019 07:40) (93% - 93%)    Constitutional: obtunded, no response to voice, no acute distress   HEENT: pupils round, 2mm, no response to light   Neck: Supple  Respiratory: Breath sounds are clear bilaterally  Cardiovascular: S1 and S2, regular rhythm  Gastrointestinal: soft, nontender  Extremities:  no edema  Musculoskeletal: no joint swelling/tenderness, no abnormal movements  Skin: No rashes    Neurological exam:  HF: obtunded, no response to voice, no eye opening  CN: face symmetric, pupils round, +corneal, 2mm- no reaction to light,   Motor: LUE moves spontaneously and purposefully, RUE 0/5, b/l lower ext + triple flexion    Sens: no reaction to painful stimuli RUE, triple flexion b/l lower ext, withdrawal to pain LUE   Reflexes: toes upgoing on right, down going on left   Coord:  unable to assess   Gait/Balance: unable to assess

## 2019-04-27 NOTE — ED ADULT NURSE REASSESSMENT NOTE - NS ED NURSE REASSESS COMMENT FT1
report given to holding PARVIZ Bai. vs wnl. code trauma cleared in setting of comfort care plan. c-collar removed for comfort. pt medicated for pain.

## 2019-04-27 NOTE — ED ADULT NURSE REASSESSMENT NOTE - NS ED NURSE REASSESS COMMENT FT1
Pt received from previous RN.  Pt aaox0. responds with grunt to pain.  VS as charted, NRB in place maintaining O2 sat of 94-97%.  Plan of care, transition from ED to admitted status discussed with family member at bedside.  All questions answered to satisfaction.   No additional needs at this time, will continue hourly rounding.

## 2019-04-27 NOTE — ED ADULT NURSE NOTE - OBJECTIVE STATEMENT
pt BIBEMS from home s/p being found unresponsive on ground by aid, + vomiting. unknown head injury. gcs 4 upon arrival pt sent directly to trauma room, code trauma called. ecchymosis noted to R forehead. L 20 g PIV placed en route by EMS. c-collar placed by RN upon arrival. see trauma flow sheet for further documentation.

## 2019-04-27 NOTE — CONSULT NOTE ADULT - ASSESSMENT
85 y/o F with ICH likely hemorraghic CVA and fall  -DNR/DNI  -Care per neuroSx  -Comfort care  Above plan d/w Dr. duran

## 2019-04-27 NOTE — H&P ADULT - NSICDXPASTSURGICALHX_GEN_ALL_CORE_FT
PAST SURGICAL HISTORY:  History of repair of hip fracture     S/P amputation of lesser toe, right     S/P CABG x 3

## 2019-04-27 NOTE — H&P ADULT - HISTORY OF PRESENT ILLNESS
TRAUMA CODE CALLED: 7:40:am  NEUROSURGERY PA CALLED: 8:09am  Neurosurgery PA Evaluation: 8:17am    Patient is an 84 year old woman with a history of dementia, DM, CAD, HTN and HLD; she presented as a trauma code to the ED at 7:40 with an abrasion above her right eye.  As per the son she had vomiting last night and hit her head on the hospital bed rail, she was found to be unresponsive this morning and EMS was called to bring her to the hospital.  CT of the head showed a large left parietal and occipital lobe ICH with surrounding cerebral edema and brain compression.    On exam pt is non-responsive to voice, she moves her left arm spontaneously and purposefully, RUE 0/5, and triple flexion to b/l lower ext. Pupils and small, 2mm, equal, and non-reactive, +corneals, +gag. Pt was given Zofran in the ED for vomting.     MOLST Reviewed, the son had already left to take care of another family member who is on home hospice. His wishes were very clear: patient is NDR/DNI, comfort measures only, no IV fluids, no NG tube, no feeding tubes, no IV ABX TRAUMA CODE CALLED: 7:40:am  NEUROSURGERY PA CALLED: 8:09am  Neurosurgery PA Evaluation: 8:17am    GCS: E1, V2, M4, GCS Total 7     Patient is an 84 year old woman with a history of dementia, DM, CAD, HTN and HLD; she presented as a trauma code to the ED at 7:40 with an abrasion above her right eye.  As per the son she had vomiting last night and hit her head on the hospital bed rail, she was found to be unresponsive this morning and EMS was called to bring her to the hospital.  CT of the head showed a large left parietal and occipital lobe ICH with surrounding cerebral edema and brain compression.    On exam pt is non-responsive to voice, she moves her left arm spontaneously and purposefully, RUE 0/5, and triple flexion to b/l lower ext. Pupils and small, 2mm, equal, and non-reactive, +corneals, +gag. Pt was given Zofran in the ED for vomting.     MOLST Reviewed, the son had already left to take care of another family member who is on home hospice. His wishes were very clear: patient is NDR/DNI, comfort measures only, no IV fluids, no NG tube, no feeding tubes, no IV ABX

## 2019-04-30 ENCOUNTER — INBOUND DOCUMENT (OUTPATIENT)
Age: 84
End: 2019-04-30

## 2019-04-30 PROBLEM — Z00.00 ENCOUNTER FOR PREVENTIVE HEALTH EXAMINATION: Status: ACTIVE | Noted: 2019-04-30

## 2019-05-02 DIAGNOSIS — D63.8 ANEMIA IN OTHER CHRONIC DISEASES CLASSIFIED ELSEWHERE: ICD-10-CM

## 2019-05-02 DIAGNOSIS — Z66 DO NOT RESUSCITATE: ICD-10-CM

## 2019-05-02 DIAGNOSIS — R41.82 ALTERED MENTAL STATUS, UNSPECIFIED: ICD-10-CM

## 2019-05-02 DIAGNOSIS — E11.9 TYPE 2 DIABETES MELLITUS WITHOUT COMPLICATIONS: ICD-10-CM

## 2019-05-02 DIAGNOSIS — E78.5 HYPERLIPIDEMIA, UNSPECIFIED: ICD-10-CM

## 2019-05-02 DIAGNOSIS — Z89.421 ACQUIRED ABSENCE OF OTHER RIGHT TOE(S): ICD-10-CM

## 2019-05-02 DIAGNOSIS — I10 ESSENTIAL (PRIMARY) HYPERTENSION: ICD-10-CM

## 2019-05-02 DIAGNOSIS — Z79.82 LONG TERM (CURRENT) USE OF ASPIRIN: ICD-10-CM

## 2019-05-02 DIAGNOSIS — I25.10 ATHEROSCLEROTIC HEART DISEASE OF NATIVE CORONARY ARTERY WITHOUT ANGINA PECTORIS: ICD-10-CM

## 2019-05-02 DIAGNOSIS — I25.2 OLD MYOCARDIAL INFARCTION: ICD-10-CM

## 2019-05-02 DIAGNOSIS — G93.5 COMPRESSION OF BRAIN: ICD-10-CM

## 2019-05-02 DIAGNOSIS — Z79.84 LONG TERM (CURRENT) USE OF ORAL HYPOGLYCEMIC DRUGS: ICD-10-CM

## 2019-05-02 DIAGNOSIS — Z51.5 ENCOUNTER FOR PALLIATIVE CARE: ICD-10-CM

## 2019-05-02 DIAGNOSIS — I61.9 NONTRAUMATIC INTRACEREBRAL HEMORRHAGE, UNSPECIFIED: ICD-10-CM

## 2019-05-02 DIAGNOSIS — F03.90 UNSPECIFIED DEMENTIA WITHOUT BEHAVIORAL DISTURBANCE: ICD-10-CM

## 2019-05-02 DIAGNOSIS — G93.6 CEREBRAL EDEMA: ICD-10-CM

## 2019-09-11 NOTE — CONSULT NOTE ADULT - ASSESSMENT
Fabian Diana discharged to home accompanied by other independent.   Patient provided with the following educational materials upon discharge:  D/c, followup, meds.   Valuables and belongings sent with patient.   discharge instructions, medications and follow up appointments reviewed with patient and other independent.  Patient and other independent verbalized understanding  
A/P: 82F with L IT Fx  Pain control  NWB LLE  FU CT Pelvis  Medical clearance/optimization for OR  NPO  IVF when NPO  Hold chemical DVT ppx  D/w Dr. Stock and agrees with above plan  D/w plan w/ pt son at bedside, all questions answered  Plan for OR today 9/13
This is an 82 year old female s/p left IM nail with high thrombosis risk and low bleeding risk due to age, immobility, BMI- requiring anticoaglation.    Discussed risk vs benefit with son, Esdras, verbalized understanding of Lovenox and necessity for 4 week post-operative period.    Plan:  ::DC ASA  ::Start Lovenox 40 mg SQ daily, first dose today at noon  ::Daily CBC/BMP  :;Enc ambulation  ::B/l sammy    Thank you for this consult, will continue to follow.

## 2022-02-23 NOTE — DISCHARGE NOTE ADULT - NSFTFHOMEHTHYNRD_GEN_ALL_CORE
No Yes Retention Suture Text: Retention sutures were placed to support the closure and prevent dehiscence.

## 2024-11-11 NOTE — DISCHARGE NOTE ADULT - NS MD DC PLAN IMMU FLU REF OTH
Vaccine Double Check for TDAP (Boostrix)   1. Ordered vaccine(s) are appropriate for patient's age: Yes  2. Needle size is patient appropriate: Yes  3. Vaccine order matches syringe/vial that has been prepared: Yes  4. Vaccine interval is correct via standard adult/pediatric vaccine schedule/WIR/Epic: Yes  5. Volume in syringe is correct compared to order: Yes  6. Vaccine is : No  7. For pediatrics; does patient qualify for VFC: N/A     Refused